# Patient Record
Sex: MALE | Race: WHITE | NOT HISPANIC OR LATINO | Employment: OTHER | URBAN - METROPOLITAN AREA
[De-identification: names, ages, dates, MRNs, and addresses within clinical notes are randomized per-mention and may not be internally consistent; named-entity substitution may affect disease eponyms.]

---

## 2017-01-19 RX ORDER — DOXYCYCLINE HYCLATE 100 MG/1
100 TABLET, DELAYED RELEASE ORAL 2 TIMES DAILY
Qty: 20 TABLET | Refills: 0 | Status: SHIPPED | OUTPATIENT
Start: 2017-01-19 | End: 2017-02-14

## 2017-01-23 ENCOUNTER — OFFICE VISIT (OUTPATIENT)
Dept: FAMILY MEDICINE CLINIC | Facility: CLINIC | Age: 62
End: 2017-01-23

## 2017-01-23 VITALS
HEART RATE: 81 BPM | TEMPERATURE: 98.1 F | SYSTOLIC BLOOD PRESSURE: 102 MMHG | WEIGHT: 231.6 LBS | DIASTOLIC BLOOD PRESSURE: 68 MMHG | BODY MASS INDEX: 29.72 KG/M2 | HEIGHT: 74 IN | RESPIRATION RATE: 16 BRPM | OXYGEN SATURATION: 94 %

## 2017-01-23 DIAGNOSIS — J44.9 CHRONIC OBSTRUCTIVE PULMONARY DISEASE, UNSPECIFIED COPD TYPE (HCC): ICD-10-CM

## 2017-01-23 DIAGNOSIS — L02.429 FURUNCLE OF AXILLA: ICD-10-CM

## 2017-01-23 DIAGNOSIS — R93.7 ABNORMAL CT SCAN, LUMBAR SPINE: Primary | ICD-10-CM

## 2017-01-23 DIAGNOSIS — Z87.820 HISTORY OF MULTIPLE CONCUSSIONS: ICD-10-CM

## 2017-01-23 DIAGNOSIS — G89.29 OTHER CHRONIC PAIN: ICD-10-CM

## 2017-01-23 DIAGNOSIS — K40.21 BILATERAL RECURRENT INGUINAL HERNIA WITHOUT OBSTRUCTION OR GANGRENE: ICD-10-CM

## 2017-01-23 DIAGNOSIS — M15.9 GENERALIZED OSTEOARTHRITIS: ICD-10-CM

## 2017-01-23 PROBLEM — K40.90 INGUINAL HERNIA, RIGHT: Status: ACTIVE | Noted: 2017-01-23

## 2017-01-23 PROBLEM — B00.9 HERPES: Status: ACTIVE | Noted: 2017-01-23

## 2017-01-23 PROBLEM — S01.00XA OPEN SCALP WOUND: Status: ACTIVE | Noted: 2017-01-23

## 2017-01-23 PROBLEM — R53.83 FATIGUE: Status: ACTIVE | Noted: 2017-01-23

## 2017-01-23 PROBLEM — F52.21 ED (ERECTILE DYSFUNCTION) OF NON-ORGANIC ORIGIN: Status: ACTIVE | Noted: 2017-01-23

## 2017-01-23 PROBLEM — F34.1 DEPRESSION, NEUROTIC: Status: ACTIVE | Noted: 2017-01-23

## 2017-01-23 PROBLEM — F60.9: Status: ACTIVE | Noted: 2017-01-23

## 2017-01-23 PROBLEM — F41.9 ANXIETY: Status: ACTIVE | Noted: 2017-01-23

## 2017-01-23 PROBLEM — J45.909 AB (ASTHMATIC BRONCHITIS): Status: ACTIVE | Noted: 2017-01-23

## 2017-01-23 PROBLEM — M94.0 COSTAL CHONDRITIS: Status: ACTIVE | Noted: 2017-01-23

## 2017-01-23 PROBLEM — H61.20 CERUMEN IMPACTION: Status: ACTIVE | Noted: 2017-01-23

## 2017-01-23 PROBLEM — R60.9 DEPENDENT EDEMA: Status: ACTIVE | Noted: 2017-01-23

## 2017-01-23 PROBLEM — R35.1 EXCESSIVE URINATION AT NIGHT: Status: ACTIVE | Noted: 2017-01-23

## 2017-01-23 PROBLEM — R06.89 BREATHING DIFFICULT: Status: ACTIVE | Noted: 2017-01-23

## 2017-01-23 PROBLEM — J45.901 ACUTE ASTHMA EXACERBATION: Status: ACTIVE | Noted: 2017-01-23

## 2017-01-23 PROBLEM — K63.5 BENIGN COLONIC POLYP: Status: ACTIVE | Noted: 2017-01-23

## 2017-01-23 PROBLEM — D49.0 COLON NEOPLASM: Status: ACTIVE | Noted: 2017-01-23

## 2017-01-23 PROBLEM — R41.3 AMNESIA: Status: ACTIVE | Noted: 2017-01-23

## 2017-01-23 PROBLEM — S06.0XAA CONCUSSION: Status: ACTIVE | Noted: 2017-01-23

## 2017-01-23 PROBLEM — E04.1 SOLITARY THYROID NODULE: Status: ACTIVE | Noted: 2017-01-23

## 2017-01-23 PROBLEM — H92.09 EAR ACHE: Status: ACTIVE | Noted: 2017-01-23

## 2017-01-23 PROCEDURE — 99214 OFFICE O/P EST MOD 30 MIN: CPT | Performed by: FAMILY MEDICINE
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
## 2017-01-23 NOTE — MR AVS SNAPSHOT
Pérez CHAIREZ Alysia   1/23/2017 2:20 PM   Office Visit    Dept Phone:  [phone]   Encounter #:  [id number]    Provider:  Tuan Champion MD   Department:  [facility]                Your Full Care Plan              Your Updated Medication List          This list is accurate as of: 1/23/17  3:10 PM.  Always use your most recent med list.                * albuterol (5 MG/ML) 0.5% nebulizer solution   Commonly known as:  PROVENTIL       * albuterol 108 (90 BASE) MCG/ACT inhaler   Commonly known as:  PROVENTIL HFA;VENTOLIN HFA       * VENTOLIN  (90 BASE) MCG/ACT inhaler   Generic drug:  albuterol       aspirin 81 MG chewable tablet       azelastine 0.15 % solution nasal spray   Commonly known as:  ASTEPRO       benzonatate 200 MG capsule   Commonly known as:  TESSALON       celecoxib 200 MG capsule   Commonly known as:  CeleBREX       diazePAM 5 MG tablet   Commonly known as:  VALIUM       doxycycline 100 MG enteric coated tablet   Commonly known as:  DORYX   Take 1 tablet by mouth 2 (Two) Times a Day.       DULERA 200-5 MCG/ACT inhaler   Generic drug:  mometasone-formoterol       famciclovir 500 MG tablet   Commonly known as:  FAMVIR   3 tabs stat prn onset of fever blister       HYDROcodone-acetaminophen  MG per tablet   Commonly known as:  NORCO       ipratropium-albuterol 0.5-2.5 mg/mL nebulizer   Commonly known as:  DUO-NEB       Milk Thistle 250 MG capsule       montelukast 10 MG tablet   Commonly known as:  SINGULAIR       sildenafil 100 MG tablet   Commonly known as:  VIAGRA       * Notice:  This list has 3 medication(s) that are the same as other medications prescribed for you. Read the directions carefully, and ask your doctor or other care provider to review them with you.            We Performed the Following     Ambulatory Referral to General Surgery       You Were Diagnosed With        Codes Comments    Abnormal CT scan, lumbar  "spine    -  Primary ICD-10-CM: R93.7  ICD-9-CM: 793.7     Furuncle of axilla     ICD-10-CM: L02.429  ICD-9-CM: 680.3     Bilateral recurrent inguinal hernia without obstruction or gangrene     ICD-10-CM: K40.21  ICD-9-CM: 550.93     Generalized osteoarthritis     ICD-10-CM: M15.9  ICD-9-CM: 715.00       Instructions     None    Patient Instructions History      Upcoming Appointments     Visit Type Date Time Department    OFFICE VISIT 1/23/2017  2:20 PM [facility]    OFFICE VISIT 4/24/2017  2:40 PM [facility]      MyChart Signup     Our records indicate that you have declined [facility] signup. If you would like to sign up for [employer], please email [facility] [email or web address] or call [phone] to obtain an activation code.             Other Info from Your Visit           Your Appointments     Apr 24, 2017  2:40 PM EDT   Office Visit with Tuan Champion MD   [facility] (--)    [address] [facility] [address] [phone]           Arrive 15 minutes prior to appointment.              Allergies     No Known Allergies      Reason for Visit     Abscess R underarm.    Insect Bite Spider bite in butt area over 3 weeks.      Vital Signs     Blood Pressure Pulse Temperature Respirations Height Weight    102/68 81 98.1 °F (36.7 °C) 16 74\" (188 cm) 231 lb 9.6 oz (105 kg)    Oxygen Saturation Body Mass Index Smoking Status             94% 29.74 kg/m2 Former Smoker         Problems and Diagnoses Noted     Bronchitis    Acute asthma exacerbation    Memory loss    Anxiety problem    Arthritis of wrist    Benign colonic polyp    Breathing difficult    Chronic airway obstruction    Excess wax in ear    Chronic airway obstruction    Chronic pain    Colon neoplasm    Concussion    Inflammation of rib cartilage    Accumulation of fluid in tissues    Mood problem    Personality disorder    Ear ache    Sexual dysfunction    Excessive urination at night    " Tiredness    Generalized osteoarthritis    Herpes simplex    Inguinal hernia, right    Open wound of scalp    Degenerative joint disease of shoulder region    Other shoulder lesions, unspecified shoulder    Solitary thyroid nodule    Abnormal CT scan, lumbar spine    -  Primary    Furuncle of axilla        Hernia

## 2017-02-08 ENCOUNTER — OFFICE VISIT (OUTPATIENT)
Dept: SURGERY | Facility: CLINIC | Age: 62
End: 2017-02-08

## 2017-02-08 VITALS
SYSTOLIC BLOOD PRESSURE: 120 MMHG | OXYGEN SATURATION: 95 % | BODY MASS INDEX: 29.02 KG/M2 | HEART RATE: 80 BPM | WEIGHT: 226 LBS | DIASTOLIC BLOOD PRESSURE: 90 MMHG | RESPIRATION RATE: 20 BRPM

## 2017-02-08 DIAGNOSIS — K40.21 BILATERAL RECURRENT INGUINAL HERNIA WITHOUT OBSTRUCTION OR GANGRENE: Primary | ICD-10-CM

## 2017-02-08 PROCEDURE — 99212 OFFICE O/P EST SF 10 MIN: CPT | Performed by: SPECIALIST

## 2017-02-08 NOTE — PROGRESS NOTES
Subjective   Pérez Hatfield is a 61 y.o. male.     History of Present Illness   The patient presents today for reevaluation.  He is complaining of intermittent pelvic and perineal discomfort.  Because of these symptoms a CT scan of the pelvis was done.  The CT scan confirmed the presence of bilateral recurrent inguinal hernias.  In addition there is significant disease of his lumbar and sacral spine.  In the past, he has been seen by a neurosurgeon for these issues.  The patient also has had a colonoscopy done within the last 3 or 4 years.  No other findings were seen on the CT scan.  The patient's symptoms are very difficult to pin down, and I am by no means convinced that these hernias are responsible for any of these symptoms.    Review of Systems   Constitutional: Positive for activity change. Negative for appetite change, chills, fatigue and fever.   HENT: Negative.    Cardiovascular: Negative.    Gastrointestinal: Negative for abdominal distention, abdominal pain, blood in stool, constipation, diarrhea, nausea and vomiting.   Endocrine: Negative.    Genitourinary: Negative for difficulty urinating, dysuria and hematuria.   Musculoskeletal: Positive for arthralgias and back pain.        The patient does have bilateral lower back pain   Skin: Negative.    Allergic/Immunologic: Negative.    Neurological: Negative.    Hematological: Negative.    Psychiatric/Behavioral: Negative.        Objective   Physical Exam   Constitutional: He is oriented to person, place, and time. He appears well-developed and well-nourished.   HENT:   Head: Normocephalic.   Eyes: No scleral icterus.   Neck: Normal range of motion. Neck supple. No JVD present.   Cardiovascular: Normal rate and regular rhythm.    Pulmonary/Chest: Effort normal. He has no wheezes. He has no rales.   Abdominal: Soft. Bowel sounds are normal. He exhibits no distension. There is no tenderness. A hernia is present.   The patient has bilateral inguinal hernias.   Both appear to be direct recurrences.  The hernia on the right side appears more significant.  The patient has had an open repair on the right side, and appears to have had a laparoscopic repair on the left side.   Genitourinary: Penis normal.   Genitourinary Comments: The scrotal exam is normal.  The patient has bilaterally descended testicles with no testicular masses   Musculoskeletal: Normal range of motion. He exhibits no edema or deformity.   Neurological: He is alert and oriented to person, place, and time.   Skin: Skin is warm and dry. No erythema.   Psychiatric: He has a normal mood and affect. His behavior is normal.   Vitals reviewed.      Assessment/Plan     The patient presents today with the above issues.  Clearly he does have bilateral inguinal hernias more pronounced on the right side.  The symptoms that he is having are very difficult to pin down, and I am hesitant to believe that they are on the basis of these hernias.  Clearly the hernias could be repaired, and I told the patient that if he desires, I would be willing to repair them.  The pathology clearly should be to repair the one on the right side.  There is a palpable defect on that side which probably represents a little bit more concern than what is on the left side.

## 2017-02-14 ENCOUNTER — OFFICE VISIT (OUTPATIENT)
Dept: FAMILY MEDICINE CLINIC | Facility: CLINIC | Age: 62
End: 2017-02-14

## 2017-02-14 VITALS
SYSTOLIC BLOOD PRESSURE: 98 MMHG | OXYGEN SATURATION: 96 % | WEIGHT: 225.6 LBS | HEART RATE: 97 BPM | HEIGHT: 74 IN | DIASTOLIC BLOOD PRESSURE: 68 MMHG | RESPIRATION RATE: 17 BRPM | BODY MASS INDEX: 28.95 KG/M2 | TEMPERATURE: 98.2 F

## 2017-02-14 DIAGNOSIS — J45.901 ASTHMA WITH ACUTE EXACERBATION, UNSPECIFIED ASTHMA SEVERITY: Primary | ICD-10-CM

## 2017-02-14 DIAGNOSIS — J44.9 CHRONIC OBSTRUCTIVE PULMONARY DISEASE, UNSPECIFIED COPD TYPE (HCC): ICD-10-CM

## 2017-02-14 PROCEDURE — 90471 IMMUNIZATION ADMIN: CPT | Performed by: FAMILY MEDICINE

## 2017-02-14 PROCEDURE — 90656 IIV3 VACC NO PRSV 0.5 ML IM: CPT | Performed by: FAMILY MEDICINE

## 2017-02-14 PROCEDURE — 99213 OFFICE O/P EST LOW 20 MIN: CPT | Performed by: FAMILY MEDICINE

## 2017-02-14 RX ORDER — AZITHROMYCIN 250 MG/1
TABLET, FILM COATED ORAL
Qty: 6 TABLET | Refills: 0 | Status: SHIPPED | OUTPATIENT
Start: 2017-02-14 | End: 2017-05-11

## 2017-02-14 RX ORDER — BENZONATATE 200 MG/1
200 CAPSULE ORAL 3 TIMES DAILY PRN
Qty: 40 CAPSULE | Refills: 2 | Status: SHIPPED | OUTPATIENT
Start: 2017-02-14 | End: 2017-05-11

## 2017-02-14 NOTE — PROGRESS NOTES
HPI  Pérez Hatfield is a 61 y.o. male who is here for follow up of upper respiratory infection for the last several days.  Patient has known COPD and asthmatic bronchitis and in fact was scheduled for follow-up visit with his pulmonary specialist.  Continues follow-up for shoulder pain.  So has noted a spot on the bottom of his foot.       Review of Systems   HENT: Positive for congestion, rhinorrhea, sneezing and sore throat.    Eyes: Positive for discharge.   Respiratory: Positive for chest tightness and wheezing.    Gastrointestinal: Positive for constipation.   Endocrine: Positive for cold intolerance and polyuria.   Musculoskeletal: Positive for arthralgias and back pain.   Neurological: Positive for headaches.   All other systems reviewed and are negative.        Past Medical History   Diagnosis Date   • COPD (chronic obstructive pulmonary disease)    • Goiter    • Meniscus tear    • Multiple benign polyps of large intestine    • Wrist fracture        Past Surgical History   Procedure Laterality Date   • Colonoscopy     • Polypectomy     • Hernia repair     • Knee arthroplasty     • Neck surgery     • Tonsillectomy     • Wrist surgery         Family History   Problem Relation Age of Onset   • Alzheimer's disease Other    • Thyroid disease Other    • Hypertension Other        Social History     Social History   • Marital status: Single     Spouse name: N/A   • Number of children: N/A   • Years of education: N/A     Occupational History   • retired      Social History Main Topics   • Smoking status: Former Smoker   • Smokeless tobacco: Not on file   • Alcohol use Yes      Comment: social   • Drug use: Not on file   • Sexual activity: Not on file     Other Topics Concern   • Not on file     Social History Narrative         Physical Exam   Constitutional: He appears well-developed and well-nourished. No distress.   HENT:   Head: Normocephalic and atraumatic.   Nose: Nose normal.   Mouth/Throat: Oropharynx is clear  and moist.   Eyes: Conjunctivae are normal. Pupils are equal, round, and reactive to light.   Neck: Neck supple.   Cardiovascular: Normal rate.    Pulmonary/Chest: Effort normal. No respiratory distress. He has wheezes. He has no rales.   Abdominal: Soft.   Lymphadenopathy:     He has no cervical adenopathy.   Psychiatric: He has a normal mood and affect. His behavior is normal. Judgment and thought content normal.   Nursing note and vitals reviewed.        Assessment/Plan    Pérez was seen today for cough, nasal congestion, sore throat and headache.    Diagnoses and all orders for this visit:    Asthma with acute exacerbation, unspecified asthma severity    Chronic obstructive pulmonary disease, unspecified COPD type    Other orders  -     benzonatate (TESSALON) 200 MG capsule; Take 1 capsule by mouth 3 (Three) Times a Day As Needed for cough.  -     azithromycin (ZITHROMAX) 250 MG tablet; Take 2 tablets the first day, then 1 tablet daily for 4 days.  -     Cancel: Flu Vaccine Greater Than or equal to 4yo w/Preservative  -     Flu Vaccine Greater Than or Equal To 4yo PF    Patient presents for acute exacerbation of his medic bronchitis.  Some wheezes are noted but patient seems to be in no significant respiratory distress.  He has several inhalers and in fact has a follow-up visit with his pulmonary specialist.  Do not feel steroids are needed at this time.  Will give empiric anabiotic therapy as well as symptomatic treatment for cough.  Otherwise call if symptoms worsen.    This note includes information entered using a voice recognition dictation system.  Though reviewed, some nonsensible errors may remain.

## 2017-02-22 RX ORDER — DOXYCYCLINE HYCLATE 100 MG/1
TABLET, DELAYED RELEASE ORAL
Qty: 20 TABLET | Refills: 0 | Status: SHIPPED | OUTPATIENT
Start: 2017-02-22 | End: 2017-05-11

## 2017-02-23 ENCOUNTER — TELEPHONE (OUTPATIENT)
Dept: FAMILY MEDICINE CLINIC | Facility: CLINIC | Age: 62
End: 2017-02-23

## 2017-02-23 NOTE — TELEPHONE ENCOUNTER
----- Message from Keke Person sent at 2/23/2017 12:23 PM EST -----  PATIENT HAS A QUESTION ABOUT ANTIBIOTIC THAT WAS CALLED IN.   PLEASE CALL HIM [phone]

## 2017-05-11 ENCOUNTER — OFFICE VISIT (OUTPATIENT)
Dept: FAMILY MEDICINE CLINIC | Facility: CLINIC | Age: 62
End: 2017-05-11

## 2017-05-11 VITALS
SYSTOLIC BLOOD PRESSURE: 118 MMHG | RESPIRATION RATE: 16 BRPM | BODY MASS INDEX: 30.06 KG/M2 | OXYGEN SATURATION: 97 % | DIASTOLIC BLOOD PRESSURE: 74 MMHG | HEART RATE: 76 BPM | TEMPERATURE: 97.6 F | WEIGHT: 234.2 LBS | HEIGHT: 74 IN

## 2017-05-11 DIAGNOSIS — R53.83 FATIGUE, UNSPECIFIED TYPE: ICD-10-CM

## 2017-05-11 DIAGNOSIS — L08.9 SKIN INFECTION, BACTERIAL: ICD-10-CM

## 2017-05-11 DIAGNOSIS — Z87.820 HISTORY OF MULTIPLE CONCUSSIONS: ICD-10-CM

## 2017-05-11 DIAGNOSIS — B96.89 SKIN INFECTION, BACTERIAL: ICD-10-CM

## 2017-05-11 DIAGNOSIS — Z20.818 MRSA EXPOSURE: ICD-10-CM

## 2017-05-11 DIAGNOSIS — Z79.899 HIGH RISK MEDICATION USE: ICD-10-CM

## 2017-05-11 DIAGNOSIS — F41.9 ANXIETY: ICD-10-CM

## 2017-05-11 DIAGNOSIS — E04.1 SOLITARY THYROID NODULE: Primary | ICD-10-CM

## 2017-05-11 DIAGNOSIS — M48.02 CERVICAL SPINAL STENOSIS: ICD-10-CM

## 2017-05-11 DIAGNOSIS — K63.5 BENIGN COLONIC POLYP: ICD-10-CM

## 2017-05-11 DIAGNOSIS — Z00.00 HEALTH CARE MAINTENANCE: ICD-10-CM

## 2017-05-11 PROCEDURE — 99214 OFFICE O/P EST MOD 30 MIN: CPT | Performed by: FAMILY MEDICINE

## 2017-05-11 RX ORDER — DIAZEPAM 5 MG/1
5 TABLET ORAL EVERY 8 HOURS PRN
Qty: 40 TABLET | Refills: 2 | Status: SHIPPED | OUTPATIENT
Start: 2017-05-11 | End: 2020-01-27

## 2017-05-12 LAB
ALBUMIN SERPL-MCNC: 4.3 G/DL (ref 3.5–5.2)
ALBUMIN/GLOB SERPL: 1.5 G/DL
ALP SERPL-CCNC: 84 U/L (ref 39–117)
ALT SERPL-CCNC: 21 U/L (ref 1–41)
AST SERPL-CCNC: 20 U/L (ref 1–40)
BASOPHILS # BLD AUTO: 0.03 10*3/MM3 (ref 0–0.2)
BASOPHILS NFR BLD AUTO: 0.4 % (ref 0–1.5)
BILIRUB SERPL-MCNC: 0.5 MG/DL (ref 0.1–1.2)
BUN SERPL-MCNC: 22 MG/DL (ref 8–23)
BUN/CREAT SERPL: 19.5 (ref 7–25)
CALCIUM SERPL-MCNC: 9.5 MG/DL (ref 8.6–10.5)
CHLORIDE SERPL-SCNC: 99 MMOL/L (ref 98–107)
CO2 SERPL-SCNC: 28.7 MMOL/L (ref 22–29)
CREAT SERPL-MCNC: 1.13 MG/DL (ref 0.76–1.27)
EOSINOPHIL # BLD AUTO: 0.39 10*3/MM3 (ref 0–0.7)
EOSINOPHIL NFR BLD AUTO: 5.1 % (ref 0.3–6.2)
ERYTHROCYTE [DISTWIDTH] IN BLOOD BY AUTOMATED COUNT: 13.3 % (ref 11.5–14.5)
GLOBULIN SER CALC-MCNC: 2.8 GM/DL
GLUCOSE SERPL-MCNC: 88 MG/DL (ref 65–99)
HCT VFR BLD AUTO: 47.7 % (ref 40.4–52.2)
HCV AB S/CO SERPL IA: 0.1 S/CO RATIO (ref 0–0.9)
HGB BLD-MCNC: 15.2 G/DL (ref 13.7–17.6)
IMM GRANULOCYTES # BLD: 0.02 10*3/MM3 (ref 0–0.03)
IMM GRANULOCYTES NFR BLD: 0.3 % (ref 0–0.5)
LYMPHOCYTES # BLD AUTO: 2.92 10*3/MM3 (ref 0.9–4.8)
LYMPHOCYTES NFR BLD AUTO: 38.1 % (ref 19.6–45.3)
MCH RBC QN AUTO: 30.7 PG (ref 27–32.7)
MCHC RBC AUTO-ENTMCNC: 31.9 G/DL (ref 32.6–36.4)
MCV RBC AUTO: 96.4 FL (ref 79.8–96.2)
MONOCYTES # BLD AUTO: 0.9 10*3/MM3 (ref 0.2–1.2)
MONOCYTES NFR BLD AUTO: 11.7 % (ref 5–12)
NEUTROPHILS # BLD AUTO: 3.41 10*3/MM3 (ref 1.9–8.1)
NEUTROPHILS NFR BLD AUTO: 44.4 % (ref 42.7–76)
PLATELET # BLD AUTO: 176 10*3/MM3 (ref 140–500)
POTASSIUM SERPL-SCNC: 4.1 MMOL/L (ref 3.5–5.2)
PROT SERPL-MCNC: 7.1 G/DL (ref 6–8.5)
RBC # BLD AUTO: 4.95 10*6/MM3 (ref 4.6–6)
SODIUM SERPL-SCNC: 143 MMOL/L (ref 136–145)
T4 FREE SERPL-MCNC: 1.04 NG/DL (ref 0.93–1.7)
TSH SERPL DL<=0.005 MIU/L-ACNC: 2.54 MIU/ML (ref 0.27–4.2)
WBC # BLD AUTO: 7.67 10*3/MM3 (ref 4.5–10.7)

## 2017-05-14 LAB
BACTERIA SPEC AEROBE CULT: NORMAL
BACTERIA SPEC CULT: NORMAL

## 2017-05-15 ENCOUNTER — TELEPHONE (OUTPATIENT)
Dept: FAMILY MEDICINE CLINIC | Facility: CLINIC | Age: 62
End: 2017-05-15

## 2017-05-25 DIAGNOSIS — M54.5 LOW BACK PAIN, UNSPECIFIED BACK PAIN LATERALITY, UNSPECIFIED CHRONICITY, WITH SCIATICA PRESENCE UNSPECIFIED: Primary | ICD-10-CM

## 2017-05-26 DIAGNOSIS — M54.5 CHRONIC LOW BACK PAIN, UNSPECIFIED BACK PAIN LATERALITY, WITH SCIATICA PRESENCE UNSPECIFIED: Primary | ICD-10-CM

## 2017-05-26 DIAGNOSIS — G89.29 CHRONIC LOW BACK PAIN, UNSPECIFIED BACK PAIN LATERALITY, WITH SCIATICA PRESENCE UNSPECIFIED: Primary | ICD-10-CM

## 2017-06-20 ENCOUNTER — TELEPHONE (OUTPATIENT)
Dept: NEUROSURGERY | Facility: CLINIC | Age: 62
End: 2017-06-20

## 2017-06-20 ENCOUNTER — TELEPHONE (OUTPATIENT)
Dept: FAMILY MEDICINE CLINIC | Facility: CLINIC | Age: 62
End: 2017-06-20

## 2017-06-20 DIAGNOSIS — M54.16 LUMBAR RADICULOPATHY: Primary | ICD-10-CM

## 2017-06-20 NOTE — TELEPHONE ENCOUNTER
[phone]    US scheduled at [facility] & pt says it's too expensive for him to go to hospitals. Is asking if you can call him anytime after 12 to discuss.

## 2017-08-01 ENCOUNTER — OFFICE VISIT (OUTPATIENT)
Dept: NEUROSURGERY | Facility: CLINIC | Age: 62
End: 2017-08-01

## 2017-08-01 VITALS
WEIGHT: 225 LBS | HEIGHT: 74 IN | SYSTOLIC BLOOD PRESSURE: 128 MMHG | DIASTOLIC BLOOD PRESSURE: 89 MMHG | BODY MASS INDEX: 28.88 KG/M2 | HEART RATE: 74 BPM

## 2017-08-01 DIAGNOSIS — M48.061 LUMBAR SPINAL STENOSIS: Primary | ICD-10-CM

## 2017-08-01 PROCEDURE — 99214 OFFICE O/P EST MOD 30 MIN: CPT | Performed by: NEUROLOGICAL SURGERY

## 2017-08-01 RX ORDER — GABAPENTIN 300 MG/1
CAPSULE ORAL
Refills: 0 | COMMUNITY
Start: 2017-06-09 | End: 2018-07-06

## 2017-08-01 RX ORDER — HYDROCODONE BITARTRATE AND ACETAMINOPHEN 10; 325 MG/1; MG/1
TABLET ORAL
Refills: 0 | COMMUNITY
Start: 2017-07-13 | End: 2020-01-27 | Stop reason: DRUGHIGH

## 2017-08-01 RX ORDER — DEXAMETHASONE 4 MG/1
TABLET ORAL
Qty: 2 TABLET | Refills: 0 | Status: SHIPPED | OUTPATIENT
Start: 2017-08-01 | End: 2017-08-17 | Stop reason: HOSPADM
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
## 2017-08-17 ENCOUNTER — HOSPITAL ENCOUNTER (OUTPATIENT)
Dept: GENERAL RADIOLOGY | Facility: HOSPITAL | Age: 62
Discharge: HOME | End: 2017-08-17
Attending: NEUROLOGICAL SURGERY

## 2017-08-17 ENCOUNTER — HOSPITAL ENCOUNTER (OUTPATIENT)
Dept: CT IMAGING | Facility: HOSPITAL | Age: 62
Discharge: HOME | End: 2017-08-17
Attending: NEUROLOGICAL SURGERY | Admitting: NEUROLOGICAL SURGERY

## 2017-08-17 VITALS
SYSTOLIC BLOOD PRESSURE: 125 MMHG | BODY MASS INDEX: 28.88 KG/M2 | WEIGHT: 225 LBS | RESPIRATION RATE: 18 BRPM | HEIGHT: 74 IN | HEART RATE: 56 BPM | TEMPERATURE: 97.5 F | OXYGEN SATURATION: 95 % | DIASTOLIC BLOOD PRESSURE: 84 MMHG

## 2017-08-17 DIAGNOSIS — M48.061 LUMBAR SPINAL STENOSIS: ICD-10-CM

## 2017-08-17 PROCEDURE — 72052 X-RAY EXAM NECK SPINE 6/>VWS: CPT

## 2017-08-17 PROCEDURE — 72114 X-RAY EXAM L-S SPINE BENDING: CPT

## 2017-08-17 PROCEDURE — 0 IOPAMIDOL 61 % SOLUTION: Performed by: NEUROLOGICAL SURGERY

## 2017-08-17 PROCEDURE — 72125 CT NECK SPINE W/O DYE: CPT

## 2017-08-17 PROCEDURE — 72131 CT LUMBAR SPINE W/O DYE: CPT

## 2017-08-17 PROCEDURE — 72270 MYELOGPHY 2/> SPINE REGIONS: CPT

## 2017-08-17 RX ORDER — ACETAMINOPHEN 325 MG/1
650 TABLET ORAL EVERY 4 HOURS PRN
Status: DISCONTINUED | OUTPATIENT
Start: 2017-08-17 | End: 2017-08-18 | Stop reason: HOSPADM

## 2017-08-17 RX ORDER — HYDROCODONE BITARTRATE AND ACETAMINOPHEN 5; 325 MG/1; MG/1
1 TABLET ORAL EVERY 4 HOURS PRN
Status: DISCONTINUED | OUTPATIENT
Start: 2017-08-17 | End: 2017-08-18 | Stop reason: HOSPADM

## 2017-08-17 RX ORDER — LIDOCAINE HYDROCHLORIDE 10 MG/ML
10 INJECTION, SOLUTION INFILTRATION; PERINEURAL ONCE
Status: COMPLETED | OUTPATIENT
Start: 2017-08-17 | End: 2017-08-17

## 2017-08-17 RX ADMIN — LIDOCAINE HYDROCHLORIDE 4 ML: 10 INJECTION, SOLUTION INFILTRATION; PERINEURAL at 07:56

## 2017-08-17 RX ADMIN — IOPAMIDOL 15 ML: 612 INJECTION, SOLUTION INTRATHECAL at 07:58

## 2017-08-17 NOTE — NURSING NOTE
Reviewed discharge instructions with patient. Patient able to teach back. Wheeled patient out to lobby via wheelchair where sister awaited with car.

## 2017-08-17 NOTE — DISCHARGE INSTRUCTIONS
EDUCATION /DISCHARGE INSTRUCTIONS:  A myelogram is a special radiology procedure of the spinal cord, spinal nerves and other related structures.  You will be awake during the examination.  An area of your lower back will be cleansed with an antiseptic solution.  The physician will inject a numbing medication in your lower back.  While your back is numb, a needle will be placed in the lower back area.  A small amount of spinal fluid may be withdrawn and sent to the lab if ordered by your physician. While the needle is in the back, an injection of a contrast material (xray dye) will be given through the needle.  The contrast material will allow the physician to see the spinal cord and spinal nerves.  Once injected, the needle will be removed and a band aid will be placed over the injection site.  The table will be tilted during the process to allow the contrast material to flow to particular areas in the spine.  Following the injection and xrays, you will be taken to the CT scan where more pictures will be taken. After the procedure is finished, the contrast material will be absorbed by your body and eliminated through your kidneys.  The radiologist will study and interpret your myelogram and send the results to your physician.    Procedure risks of a myelogram include, but are not limited to:  *  Bleeding   *  seizure  *  Infection   *  Headache, possibly severe requiring  *  Contrast reaction      a blood patch  *  Nerve or cord injury  *  Paralysis and death    Benefits of the procedure:  *  Best examination for delineating pathology related to spinal cord compression from a disc and/or nerve root compression    Alternatives to the procedure:  MRI - a non invasive procedure requiring intravenous contrast injection.  Cannot be done on patients with certain pacemakers or metal in the body.  MRI risks include possible reaction to the contrast material, movement of metal located in the body.  Benefit to MRI:   Non-invasive and usually painless procedure.  THIS EDUCATION INFORMATION WAS REVIEWED PRIOR TO PROCEDURE AND CONSENT. Patient initials________________Time_______0715___________    Important information following your myelogram:  *  Lie down with your head elevated no more than 2 pillows high today & tonight  *  Tomorrow, lie down with 1 pillow all day and all night  *  Sit up to eat meals and use the bathroom, otherwise, lie down  *  Do not drive for 48 hours following a myelogram  *  You may remove the bandage and shower in the morning  *  Increase your fluids for the next 24 hours.  Caffeinated drinks are encouraged.      Resume taking diabetic medication on _____________N/A_______________________    Resume taking blood thinner or aspirin on ___________Resume on 8/18/17______________________    Headaches are a common side effect after a myelogram.  If you get a headache, you should stay flat in bed and drink plenty of fluids. If the headache persist and does not go away with rest/medication, CALL Dr. Fraser at [phone]

## 2017-08-29 ENCOUNTER — OFFICE VISIT (OUTPATIENT)
Dept: NEUROSURGERY | Facility: CLINIC | Age: 62
End: 2017-08-29

## 2017-08-29 VITALS
HEART RATE: 71 BPM | BODY MASS INDEX: 28.88 KG/M2 | DIASTOLIC BLOOD PRESSURE: 82 MMHG | WEIGHT: 225 LBS | HEIGHT: 74 IN | SYSTOLIC BLOOD PRESSURE: 125 MMHG

## 2017-08-29 DIAGNOSIS — M48.061 LUMBAR SPINAL STENOSIS: Primary | ICD-10-CM

## 2017-08-29 PROCEDURE — 99213 OFFICE O/P EST LOW 20 MIN: CPT | Performed by: NEUROLOGICAL SURGERY

## 2017-08-29 NOTE — PROGRESS NOTES
Subjective   Patient ID: Pérez Hatfield is a 62 y.o. male is here today for follow-up with new Lumbar Myelogram ordered for back pain that radiates into bilateral lower extremity's with numbness and tingling in bilateral lower extremity's.     History of Present Illness     This patient continues with fairly severe pain primarily in his lower back.  There is radiation into both of his legs but it is worse on the right than the left.  He also has numbness and tingling in both of his legs.    The following portions of the patient's history were reviewed and updated as appropriate: allergies, current medications, past family history, past medical history, past social history, past surgical history and problem list.    Review of Systems   Constitutional: Positive for activity change.   Respiratory: Negative for chest tightness and shortness of breath.    Cardiovascular: Negative for chest pain.   Musculoskeletal: Positive for back pain.        Bilateral leg pain   Neurological: Positive for numbness.        Tingling in lower extremity's   All other systems reviewed and are negative.      Objective   Physical Exam   Constitutional: He is oriented to person, place, and time. He appears well-developed and well-nourished.   Neurological: He is oriented to person, place, and time.     Neurologic Exam     Mental Status   Oriented to person, place, and time.       Assessment/Plan   Independent Review of Radiographic Studies:      I reviewed his plain films, myelogram, and CT scan in the cervical and lumbar spine myself.  The plain films show a solid fusion at the cervical spine from C3 down to C6.  There is no evidence of abnormal movement on flexion and extension.  The neural foramina are widely patent.  In the lumbar spine there is also no evidence of instability and just minimal scoliosis.  In the lumbar portion of the films there is fairly significant lateral recess stenosis at L4 5 bilaterally.  L5-S1 is fairly open but  there is some left lateral recess stenosis at L3 4 and right lateral recess stenosis at L2-3.  L1 2 is fairly open.  On the oblique films the lateral recess stenosis at L3 4 is a little more significant.  On the standing films the stenosis at L4 5 is a lot worse than on the prone films.  The pictures in the cervical spine are not well opacified.  On the cervical portion of the CT scan C2 3 shows a little foraminal stenosis but not severe especially considering the level below is fused.  C3 4 is widely open bilaterally as is C4 5 and C5 6.  C6 7 shows some central disc stenosis and some foraminal stenosis but not severe and C7-T1 is fairly open.  In the lumbar portion of the test the lower thoracic spine down to L1 2 looks okay.  At L2-3 there is definite right lateral recess stenosis and bilateral stenosis at L3 4 and L4 5.  L5-S1 is fairly open.    Medical Decision Making:      I told the patient about the test.  I told him that from my point of view I would not recommend any type of surgery at this point on his neck.  I did discuss a 3 level decompression and fusion with him however in his lower back.  With him having more severe lateral recess stenosis on the right side at L2-3 and L3 4 but on the left at L4 5 I think it would be difficult to do a minimally invasive decompression and still help him.  Consequently I think he will need a bilateral decompression and fusion.  I told her there was nothing here so severe that we absolutely have to do surgery but it is certainly an option if he wishes to proceed.  I told the patient about the risks, complications and expected outcome of the lumbar surgery.  I explained that there was an 80% chance of getting rid of the pain in the leg.  I explained that there would still be back pain after the surgery.  Initially this will be quite severe but will improve over time.  There is a 2 or 3% chance of infection, bleeding, CSF leak, damage to the nerve as a result of surgery,  paralysis, as well as anesthetic risk.  There is a 10% chance of recurrent problems.  There is a 10% chance of nonunion or failure of the instrumentation.  We discussed the postoperative hospital and home course.  The patient does ask to think about it and will call if he wishes to proceed.  In the meantime we will get him into see an orthopedic spine surgeon for a fusion evaluation.    If he decides to proceed he will need to be scheduled for a: Bilateral L2 to L5 laminectomy with a fusion by orthopedics    Pérez was seen today for back pain.    Diagnoses and all orders for this visit:    Lumbar spinal stenosis    Return for 2-3 week post op.

## 2017-08-29 NOTE — PATIENT INSTRUCTIONS

## 2017-09-20 ENCOUNTER — OFFICE VISIT (OUTPATIENT)
Dept: ORTHOPEDIC SURGERY | Facility: CLINIC | Age: 62
End: 2017-09-20

## 2017-09-20 VITALS — WEIGHT: 224 LBS | BODY MASS INDEX: 31.36 KG/M2 | HEIGHT: 71 IN | TEMPERATURE: 98.3 F

## 2017-09-20 DIAGNOSIS — M41.9 ACQUIRED SCOLIOSIS: ICD-10-CM

## 2017-09-20 DIAGNOSIS — M48.061 SPINAL STENOSIS OF LUMBAR REGION: Primary | ICD-10-CM

## 2017-09-20 PROCEDURE — 99204 OFFICE O/P NEW MOD 45 MIN: CPT | Performed by: ORTHOPAEDIC SURGERY
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
## 2017-09-22 RX ORDER — SODIUM CHLORIDE, SODIUM LACTATE, POTASSIUM CHLORIDE, CALCIUM CHLORIDE 600; 310; 30; 20 MG/100ML; MG/100ML; MG/100ML; MG/100ML
100 INJECTION, SOLUTION INTRAVENOUS CONTINUOUS
Status: CANCELLED | OUTPATIENT
Start: 2017-09-22

## 2017-10-06 ENCOUNTER — OFFICE VISIT (OUTPATIENT)
Dept: FAMILY MEDICINE CLINIC | Facility: CLINIC | Age: 62
End: 2017-10-06

## 2017-10-06 VITALS
SYSTOLIC BLOOD PRESSURE: 120 MMHG | BODY MASS INDEX: 30.66 KG/M2 | HEIGHT: 71 IN | TEMPERATURE: 98.1 F | DIASTOLIC BLOOD PRESSURE: 80 MMHG | HEART RATE: 72 BPM | WEIGHT: 219 LBS | OXYGEN SATURATION: 98 %

## 2017-10-06 DIAGNOSIS — J44.9 CHRONIC OBSTRUCTIVE PULMONARY DISEASE, UNSPECIFIED COPD TYPE (HCC): ICD-10-CM

## 2017-10-06 DIAGNOSIS — M15.9 GENERALIZED OSTEOARTHRITIS: ICD-10-CM

## 2017-10-06 DIAGNOSIS — E04.1 SOLITARY THYROID NODULE: Primary | ICD-10-CM

## 2017-10-06 DIAGNOSIS — M48.061 SPINAL STENOSIS OF LUMBAR REGION, UNSPECIFIED WHETHER NEUROGENIC CLAUDICATION PRESENT: ICD-10-CM

## 2017-10-06 PROCEDURE — 99214 OFFICE O/P EST MOD 30 MIN: CPT | Performed by: FAMILY MEDICINE

## 2017-10-06 NOTE — PROGRESS NOTES
HPI  Pérez Hatfield is a 62 y.o. male who is here for follow up of multiple ongoing medical issues.  Back specialist recently recommended surgical intervention but patient is concerned because of his lung disease and potential cardiac issues.  Does have a pulmonary specialist who follows his lung issues.  Patient also reports continuing problems with his hernias.  Repair had previously been recommended but that physician has since retired.  Also has known thyroid nodule in needs a follow-up ultrasound to verify stable      Review of Systems   Constitutional: Negative for chills, diaphoresis and fever.   Respiratory:        Followed by pulmonary physician for COPD   Musculoskeletal: Positive for arthralgias and back pain.        Followed also by orthopedist for shoulder and other musculoskeletal abnormalities   All other systems reviewed and are negative.        Past Medical History:   Diagnosis Date   • COPD (chronic obstructive pulmonary disease)    • Goiter    • Meniscus tear    • Multiple benign polyps of large intestine    • Wrist fracture        Past Surgical History:   Procedure Laterality Date   • COLONOSCOPY     • HERNIA REPAIR     • KNEE ARTHROPLASTY     • NECK SURGERY     • POLYPECTOMY     • TONSILLECTOMY     • WRIST SURGERY         Family History   Problem Relation Age of Onset   • Alzheimer's disease Other    • Thyroid disease Other    • Hypertension Other        Social History     Social History   • Marital status: Single     Spouse name: N/A   • Number of children: N/A   • Years of education: N/A     Occupational History   • retired      Social History Main Topics   • Smoking status: Former Smoker     Years: 10.00     Types: Cigarettes   • Smokeless tobacco: Not on file      Comment: pt not sure of when he quit   • Alcohol use Yes      Comment: social   • Drug use: Defer   • Sexual activity: Defer     Other Topics Concern   • Not on file     Social History Narrative         Physical Exam    Constitutional: He is oriented to person, place, and time. He appears well-developed and well-nourished. No distress.   HENT:   Head: Normocephalic.   Mouth/Throat: Oropharynx is clear and moist.   Eyes: Pupils are equal, round, and reactive to light.   Neck: No thyromegaly present.   Cardiovascular: Normal rate and regular rhythm.    Pulmonary/Chest: Effort normal. No respiratory distress.   Abdominal: Soft. He exhibits no distension and no mass. There is no tenderness. A hernia is present.   Musculoskeletal: He exhibits no edema or deformity.   Lymphadenopathy:     He has no cervical adenopathy.   Neurological: He is alert and oriented to person, place, and time. He displays normal reflexes.   Skin: Skin is warm and dry.   Psychiatric: He has a normal mood and affect. His behavior is normal. Judgment and thought content normal.   Vitals reviewed.        Assessment/Plan    Pérez was seen today for follow-up.    Diagnoses and all orders for this visit:    Solitary thyroid nodule  -     US Thyroid; Future    Spinal stenosis of lumbar region, unspecified whether neurogenic claudication present    Chronic obstructive pulmonary disease, unspecified COPD type    Generalized osteoarthritis      Patient is here for routine follow-up of multiple medical problems some of which are noted above.  Surgeries recommended including spinal fixation.  Also previous general surgeon had recommended hernia repairs.  Discussed with patient risks of hernia incarceration and if suddenly becomes tender and nonreducible should go to emergency room immediately.  Patient denies known history of cardiac disease.  Do recommend getting surgical clearance from pulmonary physician prior to surgical interventions.  Otherwise continue current therapy and follow-up in 6 months    This note includes information entered using a voice recognition dictation system.  Though reviewed, some nonsensible errors may remain.

## 2017-10-16 ENCOUNTER — HOSPITAL ENCOUNTER (OUTPATIENT)
Dept: ULTRASOUND IMAGING | Facility: HOSPITAL | Age: 62
Discharge: HOME | End: 2017-10-16
Attending: FAMILY MEDICINE | Admitting: FAMILY MEDICINE

## 2017-10-16 DIAGNOSIS — E04.1 SOLITARY THYROID NODULE: ICD-10-CM

## 2017-10-16 PROCEDURE — 76536 US EXAM OF HEAD AND NECK: CPT

## 2017-12-04 ENCOUNTER — OFFICE VISIT (OUTPATIENT)
Dept: FAMILY MEDICINE CLINIC | Facility: CLINIC | Age: 62
End: 2017-12-04

## 2017-12-04 VITALS
TEMPERATURE: 97.8 F | HEART RATE: 82 BPM | SYSTOLIC BLOOD PRESSURE: 124 MMHG | HEIGHT: 71 IN | RESPIRATION RATE: 16 BRPM | OXYGEN SATURATION: 96 % | BODY MASS INDEX: 29.71 KG/M2 | DIASTOLIC BLOOD PRESSURE: 86 MMHG | WEIGHT: 212.2 LBS

## 2017-12-04 DIAGNOSIS — Z79.899 HIGH RISK MEDICATION USE: ICD-10-CM

## 2017-12-04 DIAGNOSIS — J45.909 ASTHMATIC BRONCHITIS WITHOUT COMPLICATION, UNSPECIFIED ASTHMA SEVERITY, UNSPECIFIED WHETHER PERSISTENT: Primary | ICD-10-CM

## 2017-12-04 DIAGNOSIS — K63.5 BENIGN COLONIC POLYP: ICD-10-CM

## 2017-12-04 DIAGNOSIS — N40.0 PROSTATISM: ICD-10-CM

## 2017-12-04 DIAGNOSIS — K40.90 INGUINAL HERNIA, RIGHT: ICD-10-CM

## 2017-12-04 DIAGNOSIS — G89.29 OTHER CHRONIC PAIN: ICD-10-CM

## 2017-12-04 DIAGNOSIS — M48.061 SPINAL STENOSIS OF LUMBAR REGION, UNSPECIFIED WHETHER NEUROGENIC CLAUDICATION PRESENT: ICD-10-CM

## 2017-12-04 DIAGNOSIS — Z23 IMMUNIZATION DUE: ICD-10-CM

## 2017-12-04 DIAGNOSIS — Z87.820 HISTORY OF MULTIPLE CONCUSSIONS: ICD-10-CM

## 2017-12-04 DIAGNOSIS — M48.02 CERVICAL SPINAL STENOSIS: ICD-10-CM

## 2017-12-04 PROCEDURE — G0008 ADMIN INFLUENZA VIRUS VAC: HCPCS | Performed by: FAMILY MEDICINE

## 2017-12-04 PROCEDURE — 90686 IIV4 VACC NO PRSV 0.5 ML IM: CPT | Performed by: FAMILY MEDICINE

## 2017-12-04 PROCEDURE — 99213 OFFICE O/P EST LOW 20 MIN: CPT | Performed by: FAMILY MEDICINE

## 2017-12-04 RX ORDER — DOXYCYCLINE 100 MG/1
100 CAPSULE ORAL 2 TIMES DAILY
Qty: 20 CAPSULE | Refills: 0 | Status: SHIPPED | OUTPATIENT
Start: 2017-12-04 | End: 2018-01-02

## 2017-12-04 RX ORDER — BENZONATATE 200 MG/1
200 CAPSULE ORAL 3 TIMES DAILY PRN
Qty: 30 CAPSULE | Refills: 1 | Status: SHIPPED | OUTPATIENT
Start: 2017-12-04 | End: 2018-01-05
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
## 2017-12-05 LAB
ALBUMIN SERPL-MCNC: 4.4 G/DL (ref 3.6–4.8)
ALBUMIN/GLOB SERPL: 1.6 {RATIO} (ref 1.2–2.2)
ALP SERPL-CCNC: 96 IU/L (ref 39–117)
ALT SERPL-CCNC: 27 IU/L (ref 0–44)
AST SERPL-CCNC: 35 IU/L (ref 0–40)
BASOPHILS # BLD AUTO: 0 X10E3/UL (ref 0–0.2)
BASOPHILS NFR BLD AUTO: 0 %
BILIRUB SERPL-MCNC: 1.1 MG/DL (ref 0–1.2)
BUN SERPL-MCNC: 19 MG/DL (ref 8–27)
BUN/CREAT SERPL: 18 (ref 10–24)
CALCIUM SERPL-MCNC: 9.2 MG/DL (ref 8.6–10.2)
CHLORIDE SERPL-SCNC: 101 MMOL/L (ref 96–106)
CO2 SERPL-SCNC: 24 MMOL/L (ref 18–29)
CREAT SERPL-MCNC: 1.04 MG/DL (ref 0.76–1.27)
EOSINOPHIL # BLD AUTO: 0.2 X10E3/UL (ref 0–0.4)
EOSINOPHIL NFR BLD AUTO: 2 %
ERYTHROCYTE [DISTWIDTH] IN BLOOD BY AUTOMATED COUNT: 13.6 % (ref 12.3–15.4)
GFR SERPLBLD CREATININE-BSD FMLA CKD-EPI: 77 ML/MIN/1.73
GFR SERPLBLD CREATININE-BSD FMLA CKD-EPI: 89 ML/MIN/1.73
GLOBULIN SER CALC-MCNC: 2.8 G/DL (ref 1.5–4.5)
GLUCOSE SERPL-MCNC: 91 MG/DL (ref 65–99)
HCT VFR BLD AUTO: 46.9 % (ref 37.5–51)
HGB BLD-MCNC: 16.1 G/DL (ref 13–17.7)
IMM GRANULOCYTES # BLD: 0 X10E3/UL (ref 0–0.1)
IMM GRANULOCYTES NFR BLD: 0 %
LYMPHOCYTES # BLD AUTO: 2.7 X10E3/UL (ref 0.7–3.1)
LYMPHOCYTES NFR BLD AUTO: 25 %
MCH RBC QN AUTO: 32.3 PG (ref 26.6–33)
MCHC RBC AUTO-ENTMCNC: 34.3 G/DL (ref 31.5–35.7)
MCV RBC AUTO: 94 FL (ref 79–97)
MONOCYTES # BLD AUTO: 1.1 X10E3/UL (ref 0.1–0.9)
MONOCYTES NFR BLD AUTO: 10 %
NEUTROPHILS # BLD AUTO: 6.8 X10E3/UL (ref 1.4–7)
NEUTROPHILS NFR BLD AUTO: 63 %
PLATELET # BLD AUTO: 207 X10E3/UL (ref 150–379)
POTASSIUM SERPL-SCNC: 3.7 MMOL/L (ref 3.5–5.2)
PROT SERPL-MCNC: 7.2 G/DL (ref 6–8.5)
PSA SERPL-MCNC: 0.4 NG/ML (ref 0–4)
RBC # BLD AUTO: 4.98 X10E6/UL (ref 4.14–5.8)
SODIUM SERPL-SCNC: 140 MMOL/L (ref 134–144)
WBC # BLD AUTO: 10.7 X10E3/UL (ref 3.4–10.8)

## 2017-12-14 ENCOUNTER — HOSPITAL ENCOUNTER (OUTPATIENT)
Facility: HOSPITAL | Age: 62
Setting detail: HOSPITAL OUTPATIENT SURGERY
End: 2017-12-14
Attending: SURGERY | Admitting: SURGERY

## 2017-12-14 ENCOUNTER — OFFICE VISIT (OUTPATIENT)
Dept: SURGERY | Facility: CLINIC | Age: 62
End: 2017-12-14

## 2017-12-14 VITALS
OXYGEN SATURATION: 96 % | HEART RATE: 68 BPM | BODY MASS INDEX: 30.75 KG/M2 | DIASTOLIC BLOOD PRESSURE: 82 MMHG | WEIGHT: 220.5 LBS | SYSTOLIC BLOOD PRESSURE: 121 MMHG

## 2017-12-14 DIAGNOSIS — K40.90 RIGHT INGUINAL HERNIA: Primary | ICD-10-CM

## 2017-12-14 DIAGNOSIS — K40.90 LEFT INGUINAL HERNIA: ICD-10-CM

## 2017-12-14 PROCEDURE — 99214 OFFICE O/P EST MOD 30 MIN: CPT | Performed by: SURGERY

## 2017-12-16 NOTE — PROGRESS NOTES
Subjective   Pérez Hatfield is a 62 y.o. male who presents to the office from [facility] for a symptomatic right inguinal hernia.    History of Present Illness     The patient has had bilateral inguinal hernia repairs and was diagnosed earlier this year with recurrent bilateral inguinal hernias.  He is known to have a moderately sized right inguinal hernia and a small left inguinal hernia that contained fat.  Over the past several weeks the right inguinal hernia has gotten larger and increasingly symptomatic.  He has not had any change in his bowel or bladder function.  The recurrent left inguinal hernia remains asymptomatic.    Review of Systems   Constitutional: Positive for activity change. Negative for appetite change, fatigue and fever.   HENT: Negative for trouble swallowing and voice change.    Respiratory: Negative for chest tightness and shortness of breath.    Cardiovascular: Negative for chest pain and palpitations.   Gastrointestinal: Positive for abdominal pain. Negative for blood in stool, constipation, diarrhea, nausea and vomiting.   Endocrine: Negative for cold intolerance and heat intolerance.   Genitourinary: Negative for dysuria and flank pain.   Neurological: Negative for dizziness and light-headedness.   Hematological: Negative for adenopathy. Does not bruise/bleed easily.   Psychiatric/Behavioral: Negative for agitation and confusion.     Past Medical History:   Diagnosis Date   • COPD (chronic obstructive pulmonary disease)    • Goiter    • Meniscus tear    • Multiple benign polyps of large intestine    • Wrist fracture      Past Surgical History:   Procedure Laterality Date   • COLONOSCOPY     • HERNIA REPAIR     • KNEE ARTHROPLASTY     • NECK SURGERY     • POLYPECTOMY     • TONSILLECTOMY     • WRIST SURGERY       Family History   Problem Relation Age of Onset   • Alzheimer's disease Other    • Thyroid disease Other    • Hypertension Other      Social History     Social History   •  Marital status: Single     Spouse name: N/A   • Number of children: N/A   • Years of education: N/A     Occupational History   • retired      Social History Main Topics   • Smoking status: Former Smoker     Years: 10.00     Types: Cigarettes   • Smokeless tobacco: Not on file      Comment: pt not sure of when he quit   • Alcohol use Yes      Comment: social   • Drug use: Defer   • Sexual activity: Defer     Other Topics Concern   • Not on file     Social History Narrative       Objective   Physical Exam   Constitutional: He is oriented to person, place, and time. He appears well-developed and well-nourished.  Non-toxic appearance.   Eyes: EOM are normal. No scleral icterus.   Pulmonary/Chest: Effort normal. No respiratory distress.   Abdominal: Soft. Normal appearance. There is no tenderness. A hernia is present. Hernia confirmed positive in the right inguinal area (Moderately sized reducible right inguinal hernia) and confirmed positive in the left inguinal area (Small reducible left inguinal hernia).   Neurological: He is alert and oriented to person, place, and time.   Skin: Skin is warm and dry.   Psychiatric: He has a normal mood and affect. His behavior is normal. Judgment and thought content normal.       Assessment/Plan       The primary encounter diagnosis was Right inguinal hernia. A diagnosis of Left inguinal hernia was also pertinent to this visit.    The patient has bilateral inguinal hernias.  The right inguinal hernia is getting larger and increasingly symptomatic.  The left inguinal hernia is small and asymptomatic.  This was discussed at length with him.  He lives at home by himself and a bilateral inguinal hernia repair may be too limiting for him.  He has been scheduled for a right inguinal hernia repair.  The patient understands the indications, alternatives, risks, and benefits of the procedure and wishes to proceed.

## 2018-01-02 ENCOUNTER — TELEPHONE (OUTPATIENT)
Dept: FAMILY MEDICINE CLINIC | Facility: CLINIC | Age: 63
End: 2018-01-02

## 2018-01-02 RX ORDER — AMOXICILLIN 500 MG/1
500 CAPSULE ORAL 3 TIMES DAILY
Qty: 30 CAPSULE | Refills: 0 | Status: SHIPPED | OUTPATIENT
Start: 2018-01-02 | End: 2018-01-05

## 2018-01-02 NOTE — TELEPHONE ENCOUNTER
Left voice message.     ----- Message from Tuan Champion MD sent at 1/2/2018 11:00 AM EST -----  Inform patient prescription sent for a penicillin antibiotic.    ----- Message -----     From: Keke Person     Sent: 1/2/2018  10:14 AM       To: Tuan Champion MD    PT WOULD LIKE TO KNOW IF YOU COULD SEND HIM AN ANTIBIOTIC.  SAYS HE FINISHED THE LAST ONE AND 3 DAYS LATER STARTED FEELING BAD AGAIN    [facility] [place] [address] [phone] [facility] [phone] FX

## 2018-01-05 ENCOUNTER — OFFICE VISIT (OUTPATIENT)
Dept: FAMILY MEDICINE CLINIC | Facility: CLINIC | Age: 63
End: 2018-01-05

## 2018-01-05 VITALS
BODY MASS INDEX: 29.82 KG/M2 | SYSTOLIC BLOOD PRESSURE: 100 MMHG | WEIGHT: 213 LBS | OXYGEN SATURATION: 99 % | HEART RATE: 78 BPM | HEIGHT: 71 IN | TEMPERATURE: 98.1 F | DIASTOLIC BLOOD PRESSURE: 78 MMHG

## 2018-01-05 DIAGNOSIS — K59.03 THERAPEUTIC OPIOID INDUCED CONSTIPATION: ICD-10-CM

## 2018-01-05 DIAGNOSIS — J40 BRONCHITIS: Primary | ICD-10-CM

## 2018-01-05 DIAGNOSIS — T40.2X5A THERAPEUTIC OPIOID INDUCED CONSTIPATION: ICD-10-CM

## 2018-01-05 DIAGNOSIS — J44.9 CHRONIC OBSTRUCTIVE PULMONARY DISEASE, UNSPECIFIED COPD TYPE (HCC): ICD-10-CM

## 2018-01-05 DIAGNOSIS — Z87.820 HISTORY OF MULTIPLE CONCUSSIONS: ICD-10-CM

## 2018-01-05 PROBLEM — J45.901 ACUTE ASTHMA EXACERBATION: Status: RESOLVED | Noted: 2017-01-23 | Resolved: 2018-01-05

## 2018-01-05 PROBLEM — J45.909 AB (ASTHMATIC BRONCHITIS): Status: RESOLVED | Noted: 2017-01-23 | Resolved: 2018-01-05

## 2018-01-05 PROBLEM — R06.89 BREATHING DIFFICULT: Status: RESOLVED | Noted: 2017-01-23 | Resolved: 2018-01-05

## 2018-01-05 PROCEDURE — 99213 OFFICE O/P EST LOW 20 MIN: CPT | Performed by: FAMILY MEDICINE

## 2018-01-05 RX ORDER — LUBIPROSTONE 24 UG/1
24 CAPSULE ORAL 2 TIMES DAILY WITH MEALS
Qty: 8 CAPSULE | Refills: 0 | COMMUNITY
Start: 2018-01-05 | End: 2021-02-19

## 2018-01-05 NOTE — PROGRESS NOTES
HPI  Pérez Hatfield is a 62 y.o. male who is here for follow up persistent bronchitis though he thinks it is improving with current anabiotic therapy.  Also reports difficulty with known hernia.  Reports constipation problems and has been on opiates for many years from pain management physicians etc.  Was scheduled for hernia repair in February however is having second thoughts especially because of pain concerns?      Review of Systems   Constitutional: Negative for chills, diaphoresis and fever.   HENT: Positive for congestion and sore throat.    Respiratory: Positive for cough. Negative for shortness of breath.    Cardiovascular: Negative for chest pain.   Gastrointestinal: Positive for constipation.   Musculoskeletal: Positive for arthralgias and back pain.   All other systems reviewed and are negative.        Past Medical History:   Diagnosis Date   • COPD (chronic obstructive pulmonary disease)    • Goiter    • Meniscus tear    • Multiple benign polyps of large intestine    • Wrist fracture        Past Surgical History:   Procedure Laterality Date   • COLONOSCOPY     • HERNIA REPAIR     • KNEE ARTHROPLASTY     • NECK SURGERY     • POLYPECTOMY     • TONSILLECTOMY     • WRIST SURGERY         Family History   Problem Relation Age of Onset   • Alzheimer's disease Other    • Thyroid disease Other    • Hypertension Other        Social History     Social History   • Marital status: Single     Spouse name: N/A   • Number of children: N/A   • Years of education: N/A     Occupational History   • retired      Social History Main Topics   • Smoking status: Former Smoker     Years: 10.00     Types: Cigarettes   • Smokeless tobacco: Not on file      Comment: pt not sure of when he quit   • Alcohol use Yes      Comment: social   • Drug use: Defer   • Sexual activity: Defer     Other Topics Concern   • Not on file     Social History Narrative         Physical Exam   Constitutional: He is oriented to person, place, and time. He  appears well-nourished. No distress.   HENT:   Mouth/Throat: Oropharynx is clear and moist.   Eyes: Conjunctivae are normal. Pupils are equal, round, and reactive to light.   Neck: Normal range of motion. No thyromegaly present.   Cardiovascular: Normal rate and regular rhythm.    Pulmonary/Chest: Effort normal. No respiratory distress. He has no decreased breath sounds. He has no wheezes. He has rhonchi. He has no rales.   Abdominal: Soft. He exhibits no distension.   Musculoskeletal: He exhibits no edema or deformity.   Neurological: He is alert and oriented to person, place, and time. Coordination normal.   Skin: Skin is warm and dry.   Psychiatric: He has a normal mood and affect. His speech is normal and behavior is normal. Judgment and thought content normal. He is not actively hallucinating. Cognition and memory are normal. He is attentive.   Nursing note and vitals reviewed.        Assessment/Plan    Pérez was seen today for cough, uri and fatigue.    Diagnoses and all orders for this visit:    Bronchitis    History of multiple concussions    Chronic obstructive pulmonary disease, unspecified COPD type    Therapeutic opioid induced constipation  -     lubiprostone (AMITIZA) 24 MCG capsule; Take 1 capsule by mouth 2 (Two) Times a Day With Meals.      Patient is here mostly for a persistent recurrent bronchitis currently taking amoxicillin and seems to be improving.  Lungs continue with diffuse rhonchi but I do not appreciate any wheezes nor rales.  Suspect mostly related to his chronic COPD.  Recommend completing current antibiotic therapy.  Call if symptoms worsen or persist.  Otherwise keep routine follow-up appointment.  Is trying to wean down his opiate therapy from pain management.  Complains of significant constipation and discussed this well could be related to opiate therapy and samples given of medication.  Call if effective and will try to get prescription for more medication.  Otherwise keep  routine follow-up appointment.    This note includes information entered using a voice recognition dictation system.  Though reviewed, some nonsensible errors may remain.

## 2018-01-23 ENCOUNTER — TELEPHONE (OUTPATIENT)
Dept: FAMILY MEDICINE CLINIC | Facility: CLINIC | Age: 63
End: 2018-01-23

## 2018-01-23 RX ORDER — AZITHROMYCIN 250 MG/1
TABLET, FILM COATED ORAL
Qty: 6 TABLET | Refills: 0 | Status: SHIPPED | OUTPATIENT
Start: 2018-01-23 | End: 2018-07-06

## 2018-01-23 NOTE — TELEPHONE ENCOUNTER
Faxed over script into [facility] pharmacy. Scanned script into the media tab, also received confirmation that pharmacy received fax on medication.    Thank you.    ----- Message from Tuan Champion MD sent at 1/23/2018  4:46 PM EST -----   Unable to get prescription he prescribed by Epic?  Prescription printed and can be called or faxed?    ----- Message -----     From: Keke Person     Sent: 1/23/2018  12:58 PM       To: Tuan Champion MD    PT SAYS HE'S STILL COUGHING AND VERY CONGESTED.  WOULD LIKE TO KNOW IF YOU COULD SEND ANOTHER ANTIBIOTIC TO HIS PHARM    [facility] [place] [address] [phone] [facility] [phone] FX    PT'S NUMBER [phone]

## 2018-01-24 RX ORDER — BENZONATATE 200 MG/1
200 CAPSULE ORAL 3 TIMES DAILY PRN
Qty: 30 CAPSULE | Refills: 1 | Status: SHIPPED | OUTPATIENT
Start: 2018-01-24 | End: 2018-11-09

## 2018-02-16 ENCOUNTER — APPOINTMENT (OUTPATIENT)
Dept: PREADMISSION TESTING | Facility: HOSPITAL | Age: 63
End: 2018-02-16

## 2018-04-04 ENCOUNTER — OFFICE VISIT (OUTPATIENT)
Dept: FAMILY MEDICINE CLINIC | Facility: CLINIC | Age: 63
End: 2018-04-04

## 2018-04-04 VITALS
RESPIRATION RATE: 15 BRPM | HEART RATE: 79 BPM | DIASTOLIC BLOOD PRESSURE: 68 MMHG | BODY MASS INDEX: 31.78 KG/M2 | WEIGHT: 227 LBS | OXYGEN SATURATION: 98 % | HEIGHT: 71 IN | TEMPERATURE: 97.8 F | SYSTOLIC BLOOD PRESSURE: 100 MMHG

## 2018-04-04 DIAGNOSIS — F34.1 DEPRESSION, NEUROTIC: ICD-10-CM

## 2018-04-04 DIAGNOSIS — F41.9 ANXIETY: ICD-10-CM

## 2018-04-04 DIAGNOSIS — M15.9 GENERALIZED OSTEOARTHRITIS: Primary | ICD-10-CM

## 2018-04-04 DIAGNOSIS — G89.29 OTHER CHRONIC PAIN: ICD-10-CM

## 2018-04-04 DIAGNOSIS — J30.9 ALLERGIC RHINITIS, UNSPECIFIED CHRONICITY, UNSPECIFIED SEASONALITY, UNSPECIFIED TRIGGER: ICD-10-CM

## 2018-04-04 DIAGNOSIS — Z87.820 HISTORY OF MULTIPLE CONCUSSIONS: ICD-10-CM

## 2018-04-04 PROCEDURE — 99213 OFFICE O/P EST LOW 20 MIN: CPT | Performed by: FAMILY MEDICINE

## 2018-04-04 RX ORDER — AZELASTINE HCL 205.5 UG/1
2 SPRAY NASAL DAILY
Qty: 30 ML | Refills: 3 | Status: SHIPPED | OUTPATIENT
Start: 2018-04-04 | End: 2019-10-28 | Stop reason: SDUPTHER

## 2018-04-04 NOTE — PROGRESS NOTES
DONTAE Hatfield is a 62 y.o. male who is here for follow up of multiple ongoing medical issues including COPD allergic rhinitis and general arthritic pains.  Reports significant allergy symptoms but otherwise seems to be doing well on current regimen with no significant new complaints.      Review of Systems   HENT: Positive for congestion and rhinorrhea.    Respiratory: Positive for shortness of breath.    Musculoskeletal: Positive for arthralgias, back pain and myalgias.   All other systems reviewed and are negative.        Past Medical History:   Diagnosis Date   • COPD (chronic obstructive pulmonary disease)    • Goiter    • Meniscus tear    • Multiple benign polyps of large intestine    • Wrist fracture        Past Surgical History:   Procedure Laterality Date   • COLONOSCOPY     • HERNIA REPAIR     • KNEE ARTHROPLASTY     • NECK SURGERY     • POLYPECTOMY     • TONSILLECTOMY     • WRIST SURGERY         Family History   Problem Relation Age of Onset   • Alzheimer's disease Other    • Thyroid disease Other    • Hypertension Other        Social History     Social History   • Marital status: Single     Spouse name: N/A   • Number of children: N/A   • Years of education: N/A     Occupational History   • retired      Social History Main Topics   • Smoking status: Former Smoker     Years: 10.00     Types: Cigarettes   • Smokeless tobacco: Not on file      Comment: pt not sure of when he quit   • Alcohol use Yes      Comment: social   • Drug use: Unknown   • Sexual activity: Defer     Other Topics Concern   • Not on file     Social History Narrative   • No narrative on file         Physical Exam   Constitutional: He is oriented to person, place, and time. He appears well-developed and well-nourished.   HENT:   Head: Normocephalic.   Mouth/Throat: Oropharynx is clear and moist.   Eyes: EOM are normal. Pupils are equal, round, and reactive to light.   Neck: Normal range of motion. No thyromegaly present.    Cardiovascular: Normal rate.    Pulmonary/Chest: Effort normal. No respiratory distress.   Abdominal: Soft. He exhibits no distension. There is no tenderness.   Musculoskeletal: He exhibits no edema or deformity.   Neurological: He is alert and oriented to person, place, and time. Coordination normal.   Skin: Skin is warm and dry.   Psychiatric: He has a normal mood and affect. His behavior is normal. Judgment and thought content normal.   Nursing note and vitals reviewed.        Assessment/Plan    Pérez was seen today for follow-up.    Diagnoses and all orders for this visit:    Generalized osteoarthritis    Allergic rhinitis, unspecified chronicity, unspecified seasonality, unspecified trigger    Anxiety    Other chronic pain    Depression, neurotic    History of multiple concussions    Other orders  -     azelastine (ASTEPRO) 0.15 % solution nasal spray; 2 sprays into each nostril Daily.        Patient is here for routine follow-up of multiple medical problems some of which are noted above.  Having runny nose and other allergy symptoms but otherwise no new complaints.  Continues with shoulder difficulties being followed by his orthopedist.  Otherwise overall status seems stable on current regimen which will be continued including follow-up every 3 months.    This note includes information entered using a voice recognition dictation system.  Though reviewed, some nonsensible errors may remain.

## 2018-07-06 ENCOUNTER — OFFICE VISIT (OUTPATIENT)
Dept: FAMILY MEDICINE CLINIC | Facility: CLINIC | Age: 63
End: 2018-07-06

## 2018-07-06 VITALS
RESPIRATION RATE: 16 BRPM | BODY MASS INDEX: 29.82 KG/M2 | SYSTOLIC BLOOD PRESSURE: 100 MMHG | HEART RATE: 80 BPM | OXYGEN SATURATION: 98 % | TEMPERATURE: 97.9 F | DIASTOLIC BLOOD PRESSURE: 75 MMHG | WEIGHT: 213 LBS | HEIGHT: 71 IN

## 2018-07-06 DIAGNOSIS — M54.50 CHRONIC MIDLINE LOW BACK PAIN WITHOUT SCIATICA: Primary | ICD-10-CM

## 2018-07-06 DIAGNOSIS — Z79.899 HIGH RISK MEDICATION USE: ICD-10-CM

## 2018-07-06 DIAGNOSIS — G89.29 CHRONIC MIDLINE LOW BACK PAIN WITHOUT SCIATICA: Primary | ICD-10-CM

## 2018-07-06 DIAGNOSIS — Z87.820 HISTORY OF MULTIPLE CONCUSSIONS: ICD-10-CM

## 2018-07-06 DIAGNOSIS — J44.9 CHRONIC OBSTRUCTIVE PULMONARY DISEASE, UNSPECIFIED COPD TYPE (HCC): ICD-10-CM

## 2018-07-06 DIAGNOSIS — M54.16 LUMBAR RADICULOPATHY: ICD-10-CM

## 2018-07-06 DIAGNOSIS — M15.9 GENERALIZED OSTEOARTHRITIS: ICD-10-CM

## 2018-07-06 PROCEDURE — 99213 OFFICE O/P EST LOW 20 MIN: CPT | Performed by: FAMILY MEDICINE

## 2018-07-06 RX ORDER — OMEPRAZOLE 40 MG/1
40 CAPSULE, DELAYED RELEASE ORAL
Qty: 30 CAPSULE | Refills: 5 | Status: SHIPPED | OUTPATIENT
Start: 2018-07-06 | End: 2020-01-27

## 2018-07-06 RX ORDER — MELOXICAM 7.5 MG/1
7.5 TABLET ORAL DAILY
Qty: 30 TABLET | Refills: 5 | Status: SHIPPED | OUTPATIENT
Start: 2018-07-06 | End: 2019-10-28
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
## 2018-07-28 LAB
BASOPHILS # BLD AUTO: 0 X10E3/UL (ref 0–0.2)
BASOPHILS NFR BLD AUTO: 0 %
BUN SERPL-MCNC: 12 MG/DL (ref 8–27)
BUN/CREAT SERPL: 13 (ref 10–24)
CALCIUM SERPL-MCNC: 9.1 MG/DL (ref 8.6–10.2)
CHLORIDE SERPL-SCNC: 104 MMOL/L (ref 96–106)
CO2 SERPL-SCNC: 22 MMOL/L (ref 20–29)
CREAT SERPL-MCNC: 0.91 MG/DL (ref 0.76–1.27)
EOSINOPHIL # BLD AUTO: 0.2 X10E3/UL (ref 0–0.4)
EOSINOPHIL NFR BLD AUTO: 2 %
ERYTHROCYTE [DISTWIDTH] IN BLOOD BY AUTOMATED COUNT: 13.3 % (ref 12.3–15.4)
GLUCOSE SERPL-MCNC: 84 MG/DL (ref 65–99)
HCT VFR BLD AUTO: 48 % (ref 37.5–51)
HGB BLD-MCNC: 16.1 G/DL (ref 13–17.7)
IMM GRANULOCYTES # BLD: 0 X10E3/UL (ref 0–0.1)
IMM GRANULOCYTES NFR BLD: 0 %
LYMPHOCYTES # BLD AUTO: 1.9 X10E3/UL (ref 0.7–3.1)
LYMPHOCYTES NFR BLD AUTO: 24 %
MCH RBC QN AUTO: 31.1 PG (ref 26.6–33)
MCHC RBC AUTO-ENTMCNC: 33.5 G/DL (ref 31.5–35.7)
MCV RBC AUTO: 93 FL (ref 79–97)
MONOCYTES # BLD AUTO: 0.7 X10E3/UL (ref 0.1–0.9)
MONOCYTES NFR BLD AUTO: 9 %
NEUTROPHILS # BLD AUTO: 5.3 X10E3/UL (ref 1.4–7)
NEUTROPHILS NFR BLD AUTO: 65 %
PLATELET # BLD AUTO: 186 X10E3/UL (ref 150–379)
POTASSIUM SERPL-SCNC: 4.2 MMOL/L (ref 3.5–5.2)
RBC # BLD AUTO: 5.18 X10E6/UL (ref 4.14–5.8)
SODIUM SERPL-SCNC: 140 MMOL/L (ref 134–144)
WBC # BLD AUTO: 8.2 X10E3/UL (ref 3.4–10.8)

## 2018-08-15 ENCOUNTER — HOSPITAL ENCOUNTER (OUTPATIENT)
Dept: GENERAL RADIOLOGY | Facility: HOSPITAL | Age: 63
Discharge: HOME | End: 2018-08-15
Attending: FAMILY MEDICINE | Admitting: FAMILY MEDICINE

## 2018-08-15 DIAGNOSIS — M54.50 CHRONIC MIDLINE LOW BACK PAIN WITHOUT SCIATICA: ICD-10-CM

## 2018-08-15 DIAGNOSIS — G89.29 CHRONIC MIDLINE LOW BACK PAIN WITHOUT SCIATICA: ICD-10-CM

## 2018-08-15 PROCEDURE — 72110 X-RAY EXAM L-2 SPINE 4/>VWS: CPT

## 2018-11-09 ENCOUNTER — OFFICE VISIT (OUTPATIENT)
Dept: FAMILY MEDICINE CLINIC | Facility: CLINIC | Age: 63
End: 2018-11-09

## 2018-11-09 VITALS
DIASTOLIC BLOOD PRESSURE: 68 MMHG | SYSTOLIC BLOOD PRESSURE: 124 MMHG | HEIGHT: 71 IN | BODY MASS INDEX: 29.96 KG/M2 | HEART RATE: 86 BPM | RESPIRATION RATE: 18 BRPM | TEMPERATURE: 98.3 F | OXYGEN SATURATION: 97 % | WEIGHT: 214 LBS

## 2018-11-09 DIAGNOSIS — Z86.010 HX OF COLONIC POLYPS: ICD-10-CM

## 2018-11-09 DIAGNOSIS — J40 BRONCHITIS: ICD-10-CM

## 2018-11-09 DIAGNOSIS — E04.1 SOLITARY THYROID NODULE: Primary | ICD-10-CM

## 2018-11-09 DIAGNOSIS — Z23 NEED FOR VACCINATION: ICD-10-CM

## 2018-11-09 DIAGNOSIS — F60.9: ICD-10-CM

## 2018-11-09 DIAGNOSIS — L98.9 SKIN LESIONS: ICD-10-CM

## 2018-11-09 PROCEDURE — 99214 OFFICE O/P EST MOD 30 MIN: CPT | Performed by: FAMILY MEDICINE

## 2018-11-09 PROCEDURE — G0008 ADMIN INFLUENZA VIRUS VAC: HCPCS | Performed by: FAMILY MEDICINE

## 2018-11-09 PROCEDURE — 90686 IIV4 VACC NO PRSV 0.5 ML IM: CPT | Performed by: FAMILY MEDICINE

## 2018-11-09 RX ORDER — AZITHROMYCIN 250 MG/1
TABLET, FILM COATED ORAL
Qty: 6 TABLET | Refills: 0 | Status: SHIPPED | OUTPATIENT
Start: 2018-11-09 | End: 2019-10-28

## 2018-11-09 RX ORDER — BENZONATATE 200 MG/1
200 CAPSULE ORAL 3 TIMES DAILY PRN
Qty: 30 CAPSULE | Refills: 1 | Status: SHIPPED | OUTPATIENT
Start: 2018-11-09 | End: 2019-10-28
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
## 2018-12-03 DIAGNOSIS — B00.4 HERPES SIMIAE INFECTION: ICD-10-CM

## 2018-12-03 RX ORDER — FAMCICLOVIR 500 MG/1
TABLET ORAL
Qty: 3 TABLET | Refills: 5 | Status: SHIPPED | OUTPATIENT
Start: 2018-12-03 | End: 2020-01-27

## 2018-12-21 RX ORDER — OSELTAMIVIR PHOSPHATE 75 MG/1
75 CAPSULE ORAL 2 TIMES DAILY
Qty: 10 CAPSULE | Refills: 0 | Status: SHIPPED | OUTPATIENT
Start: 2018-12-21 | End: 2019-10-28

## 2019-02-02 NOTE — TELEPHONE ENCOUNTER
"Spoke with the patient.  He notes that when he was in the hospital, he was given a \"butterfly\" pillow.  He notes that it is wearing out now and he would like a new order for a new pillow faxed to [facility].    He can be reached at [phone].  " no radiation

## 2019-08-08 DIAGNOSIS — K63.5 BENIGN COLONIC POLYP: ICD-10-CM

## 2019-08-08 DIAGNOSIS — K40.90 INGUINAL HERNIA, RIGHT: Primary | ICD-10-CM

## 2019-08-17 ENCOUNTER — PREP FOR SURGERY (OUTPATIENT)
Dept: OTHER | Facility: HOSPITAL | Age: 64
End: 2019-08-17

## 2019-08-17 DIAGNOSIS — Z86.010 HX OF ADENOMATOUS COLONIC POLYPS: Primary | ICD-10-CM

## 2019-10-28 ENCOUNTER — OFFICE VISIT (OUTPATIENT)
Dept: FAMILY MEDICINE CLINIC | Facility: CLINIC | Age: 64
End: 2019-10-28

## 2019-10-28 VITALS
SYSTOLIC BLOOD PRESSURE: 134 MMHG | BODY MASS INDEX: 30.27 KG/M2 | TEMPERATURE: 97.7 F | HEART RATE: 95 BPM | DIASTOLIC BLOOD PRESSURE: 76 MMHG | RESPIRATION RATE: 16 BRPM | HEIGHT: 71 IN | WEIGHT: 216.2 LBS | OXYGEN SATURATION: 95 %

## 2019-10-28 DIAGNOSIS — Z79.899 HIGH RISK MEDICATION USE: Primary | ICD-10-CM

## 2019-10-28 DIAGNOSIS — N40.0 PROSTATISM: ICD-10-CM

## 2019-10-28 DIAGNOSIS — G89.29 OTHER CHRONIC PAIN: ICD-10-CM

## 2019-10-28 DIAGNOSIS — Z87.820 HISTORY OF MULTIPLE CONCUSSIONS: ICD-10-CM

## 2019-10-28 DIAGNOSIS — Z23 IMMUNIZATION DUE: ICD-10-CM

## 2019-10-28 DIAGNOSIS — M75.112 INCOMPLETE TEAR OF LEFT ROTATOR CUFF, UNSPECIFIED WHETHER TRAUMATIC: ICD-10-CM

## 2019-10-28 DIAGNOSIS — E04.1 SOLITARY THYROID NODULE: ICD-10-CM

## 2019-10-28 DIAGNOSIS — J44.1 CHRONIC OBSTRUCTIVE PULMONARY DISEASE WITH ACUTE EXACERBATION (HCC): ICD-10-CM

## 2019-10-28 DIAGNOSIS — M54.16 LUMBAR RADICULOPATHY: ICD-10-CM

## 2019-10-28 PROBLEM — R07.9 CHEST PAIN: Status: ACTIVE | Noted: 2019-02-16
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
## 2019-10-28 PROBLEM — H60.591: Status: ACTIVE | Noted: 2019-10-28

## 2019-10-28 PROCEDURE — 99214 OFFICE O/P EST MOD 30 MIN: CPT | Performed by: FAMILY MEDICINE

## 2019-10-28 RX ORDER — AZELASTINE HCL 205.5 UG/1
2 SPRAY NASAL DAILY
Qty: 30 ML | Refills: 3 | Status: SHIPPED | OUTPATIENT
Start: 2019-10-28 | End: 2021-02-19

## 2019-10-28 RX ORDER — PANTOPRAZOLE SODIUM 40 MG/1
40 TABLET, DELAYED RELEASE ORAL DAILY
COMMUNITY
Start: 2019-02-17 | End: 2020-01-27

## 2019-10-28 RX ORDER — SUCRALFATE 1 G/1
1 TABLET ORAL 4 TIMES DAILY
COMMUNITY
Start: 2019-02-16 | End: 2020-01-27

## 2019-10-28 RX ORDER — CYCLOBENZAPRINE HCL 10 MG
TABLET ORAL
Qty: 30 TABLET | Refills: 2 | Status: SHIPPED | OUTPATIENT
Start: 2019-10-28 | End: 2021-02-19

## 2019-10-28 RX ORDER — CETIRIZINE HYDROCHLORIDE 10 MG/1
10 TABLET ORAL DAILY
Qty: 30 TABLET | Refills: 5 | Status: SHIPPED | OUTPATIENT
Start: 2019-10-28 | End: 2021-02-19

## 2019-10-28 NOTE — PROGRESS NOTES
HPI  Pérez Hatfield is a 64 y.o. male who is here for follow up multiple medical problems.  Complains of inability to sleep mostly because of pain in the sacroiliac areas.  Patient has known back problems and in fact at one time was recommended for surgical intervention including placement of hardware etc.  Scheduled for hernia surgery and shoulder surgery next month.  Multiple other medical issues addressed.  Complains of chronic runny nose previously used antihistamine nasal spray but needs new prescription.  Discussed treatment options for sleep disorder and will initially try muscle relaxer did already on diazepam and pain medication.  Treatment risks discussed.  Past due for follow-up of thyroid nodule and repeat ultrasound will be ordered.      Review of Systems   HENT: Positive for rhinorrhea.    Musculoskeletal: Positive for arthralgias and back pain.   Psychiatric/Behavioral: Positive for sleep disturbance.   All other systems reviewed and are negative.        Past Medical History:   Diagnosis Date   • COPD (chronic obstructive pulmonary disease) (CMS/HCC)    • Goiter    • Meniscus tear    • Multiple benign polyps of large intestine    • Wrist fracture        Past Surgical History:   Procedure Laterality Date   • COLONOSCOPY     • HERNIA REPAIR     • KNEE ARTHROPLASTY     • NECK SURGERY     • POLYPECTOMY     • TONSILLECTOMY     • WRIST SURGERY         Family History   Problem Relation Age of Onset   • Alzheimer's disease Other    • Thyroid disease Other    • Hypertension Other        Social History     Socioeconomic History   • Marital status: Single     Spouse name: Not on file   • Number of children: Not on file   • Years of education: Not on file   • Highest education level: Not on file   Occupational History   • Occupation: retired   Tobacco Use   • Smoking status: Former Smoker     Years: 10.00     Types: Cigarettes   • Smokeless tobacco: Never Used   • Tobacco comment: pt not sure of when he quit    Substance and Sexual Activity   • Alcohol use: Yes     Comment: social   • Drug use: No   • Sexual activity: Not Currently         Physical Exam   Constitutional: He is oriented to person, place, and time. He appears well-developed and well-nourished. No distress.   HENT:   Head: Normocephalic and atraumatic.   Nose: Nose normal.   Mouth/Throat: Oropharynx is clear and moist.   Eyes: Conjunctivae and EOM are normal. Pupils are equal, round, and reactive to light.   Neck: Normal range of motion. No thyromegaly present.   Cardiovascular: Normal rate, regular rhythm and normal heart sounds.   Pulmonary/Chest: Effort normal. No respiratory distress. He has no wheezes.   Abdominal: Soft. He exhibits no distension. There is no tenderness. A hernia is present.   Musculoskeletal: Normal range of motion. He exhibits no edema or deformity.   Neurological: He is alert and oriented to person, place, and time. Coordination normal.   Skin: Skin is warm and dry.   Psychiatric: He has a normal mood and affect. His behavior is normal. Judgment and thought content normal.   Nursing note and vitals reviewed.        Assessment/Plan    Pérez was seen today for copd, back pain and shoulder pain.    Diagnoses and all orders for this visit:    High risk medication use  -     Comprehensive Metabolic Panel  -     CBC & Differential    Prostatism  -     PSA DIAGNOSTIC  -     Urinalysis With Microscopic If Indicated (No Culture) - Urine, Clean Catch    Solitary thyroid nodule  -     US Thyroid; Future    Chronic obstructive pulmonary disease with acute exacerbation (CMS/HCC)    Other chronic pain    Lumbar radiculopathy    Incomplete tear of left rotator cuff, unspecified whether traumatic    History of multiple concussions    Other orders  -     azelastine (ASTEPRO) 0.15 % solution nasal spray; 2 sprays into the nostril(s) as directed by provider Daily.  -     cetirizine (zyrTEC) 10 MG tablet; Take 1 tablet by mouth Daily.  -      cyclobenzaprine (FLEXERIL) 10 MG tablet; 1/2-1 tab po qhs      Patient here for routine follow-up of several medical problems some of which are noted above.  Remains on pain medication especially for chronic back pain.  Again asking for sleeping medications and this was discussed.  Much of which keeps him awake is his pain especially in the low back area and recommend trial of muscle relaxer hopefully to help with sleep as well as back discomfort.  Apparently has tried gabapentin in the past and may need to retry in the future.  Scheduled for probable surgeries next month.  Does have some constipation issues again discussed probable relationship with pain medication.  He denies overuse or misuse of medication and is aware of potential side effects.  We will continue to monitor closely including follow-up appointment every 3 months.    This note includes information entered using a voice recognition dictation system.  Though reviewed, some nonsensible errors may remain.

## 2019-10-29 LAB
ALBUMIN SERPL-MCNC: 4.5 G/DL (ref 3.6–4.8)
ALBUMIN/GLOB SERPL: 1.5 {RATIO} (ref 1.2–2.2)
ALP SERPL-CCNC: 86 IU/L (ref 39–117)
ALT SERPL-CCNC: 21 IU/L (ref 0–44)
APPEARANCE UR: CLEAR
AST SERPL-CCNC: 24 IU/L (ref 0–40)
BASOPHILS # BLD AUTO: 0 X10E3/UL (ref 0–0.2)
BASOPHILS NFR BLD AUTO: 0 %
BILIRUB SERPL-MCNC: 0.8 MG/DL (ref 0–1.2)
BILIRUB UR QL STRIP: NEGATIVE
BUN SERPL-MCNC: 12 MG/DL (ref 8–27)
BUN/CREAT SERPL: 13 (ref 10–24)
CALCIUM SERPL-MCNC: 9.2 MG/DL (ref 8.6–10.2)
CHLORIDE SERPL-SCNC: 101 MMOL/L (ref 96–106)
CO2 SERPL-SCNC: 20 MMOL/L (ref 20–29)
COLOR UR: YELLOW
CREAT SERPL-MCNC: 0.92 MG/DL (ref 0.76–1.27)
EOSINOPHIL # BLD AUTO: 0.2 X10E3/UL (ref 0–0.4)
EOSINOPHIL NFR BLD AUTO: 2 %
ERYTHROCYTE [DISTWIDTH] IN BLOOD BY AUTOMATED COUNT: 13.7 % (ref 12.3–15.4)
GLOBULIN SER CALC-MCNC: 3 G/DL (ref 1.5–4.5)
GLUCOSE SERPL-MCNC: 74 MG/DL (ref 65–99)
GLUCOSE UR QL: NEGATIVE
HCT VFR BLD AUTO: 48.7 % (ref 37.5–51)
HGB BLD-MCNC: 16.9 G/DL (ref 13–17.7)
HGB UR QL STRIP: NEGATIVE
IMM GRANULOCYTES # BLD AUTO: 0 X10E3/UL (ref 0–0.1)
IMM GRANULOCYTES NFR BLD AUTO: 0 %
KETONES UR QL STRIP: NEGATIVE
LEUKOCYTE ESTERASE UR QL STRIP: NEGATIVE
LYMPHOCYTES # BLD AUTO: 2.6 X10E3/UL (ref 0.7–3.1)
LYMPHOCYTES NFR BLD AUTO: 30 %
MCH RBC QN AUTO: 32 PG (ref 26.6–33)
MCHC RBC AUTO-ENTMCNC: 34.7 G/DL (ref 31.5–35.7)
MCV RBC AUTO: 92 FL (ref 79–97)
MICRO URNS: NORMAL
MONOCYTES # BLD AUTO: 0.7 X10E3/UL (ref 0.1–0.9)
MONOCYTES NFR BLD AUTO: 8 %
NEUTROPHILS # BLD AUTO: 5.3 X10E3/UL (ref 1.4–7)
NEUTROPHILS NFR BLD AUTO: 60 %
NITRITE UR QL STRIP: NEGATIVE
PH UR STRIP: 6 [PH] (ref 5–7.5)
PLATELET # BLD AUTO: 197 X10E3/UL (ref 150–450)
POTASSIUM SERPL-SCNC: 4.2 MMOL/L (ref 3.5–5.2)
PROT SERPL-MCNC: 7.5 G/DL (ref 6–8.5)
PROT UR QL STRIP: NEGATIVE
PSA SERPL-MCNC: 0.6 NG/ML (ref 0–4)
RBC # BLD AUTO: 5.28 X10E6/UL (ref 4.14–5.8)
SODIUM SERPL-SCNC: 142 MMOL/L (ref 134–144)
SP GR UR: 1.02 (ref 1–1.03)
UROBILINOGEN UR STRIP-MCNC: 0.2 MG/DL (ref 0.2–1)
WBC # BLD AUTO: 8.8 X10E3/UL (ref 3.4–10.8)

## 2019-12-10 RX ORDER — AZITHROMYCIN 250 MG/1
TABLET, FILM COATED ORAL
Qty: 6 TABLET | Refills: 0 | Status: SHIPPED | OUTPATIENT
Start: 2019-12-10 | End: 2020-01-27

## 2019-12-16 ENCOUNTER — HOSPITAL ENCOUNTER (OUTPATIENT)
Dept: ULTRASOUND IMAGING | Facility: HOSPITAL | Age: 64
Discharge: HOME | End: 2019-12-16
Admitting: FAMILY MEDICINE

## 2019-12-16 DIAGNOSIS — E04.1 SOLITARY THYROID NODULE: ICD-10-CM

## 2019-12-16 PROCEDURE — 76536 US EXAM OF HEAD AND NECK: CPT

## 2020-01-27 ENCOUNTER — OFFICE VISIT (OUTPATIENT)
Dept: FAMILY MEDICINE CLINIC | Facility: CLINIC | Age: 65
End: 2020-01-27

## 2020-01-27 VITALS
WEIGHT: 225 LBS | TEMPERATURE: 98.3 F | BODY MASS INDEX: 31.5 KG/M2 | RESPIRATION RATE: 16 BRPM | SYSTOLIC BLOOD PRESSURE: 116 MMHG | HEIGHT: 71 IN | DIASTOLIC BLOOD PRESSURE: 78 MMHG | HEART RATE: 85 BPM | OXYGEN SATURATION: 91 %

## 2020-01-27 DIAGNOSIS — L98.9 NON-HEALING SKIN LESION OF NOSE: Primary | ICD-10-CM

## 2020-01-27 DIAGNOSIS — D22.9 BENIGN-APPEARING PIGMENTED SKIN LESION: ICD-10-CM

## 2020-01-27 PROCEDURE — 99213 OFFICE O/P EST LOW 20 MIN: CPT | Performed by: FAMILY MEDICINE

## 2020-01-27 RX ORDER — HYDROCODONE BITARTRATE AND ACETAMINOPHEN 5; 325 MG/1; MG/1
1 TABLET ORAL EVERY 6 HOURS PRN
COMMUNITY
End: 2021-02-19

## 2020-01-27 NOTE — PROGRESS NOTES
HPI  Pérez Hatfield is a 64 y.o. male who is here for concerns about skin lesions especially on his face.  Specifically's reports a lesion on the left side of his nose which he says has been present for 1 year and not healing.  Also reports in the past dermatologist removed multiple lesions from his scalp.  Skin changes around the left eye and is using medication for seborrhea.  Also has a warty growth on his lower abdomen that appears benign but he says is causing discomfort and enlarging.      Review of Systems   All other systems reviewed and are negative.        Past Medical History:   Diagnosis Date   • COPD (chronic obstructive pulmonary disease) (CMS/HCC)    • Goiter    • Meniscus tear    • Multiple benign polyps of large intestine    • Wrist fracture        Past Surgical History:   Procedure Laterality Date   • COLONOSCOPY     • HERNIA REPAIR     • KNEE ARTHROPLASTY     • NECK SURGERY     • POLYPECTOMY     • TONSILLECTOMY     • WRIST SURGERY         Family History   Problem Relation Age of Onset   • Alzheimer's disease Other    • Thyroid disease Other    • Hypertension Other        Social History     Socioeconomic History   • Marital status: Single     Spouse name: Not on file   • Number of children: Not on file   • Years of education: Not on file   • Highest education level: Not on file   Occupational History   • Occupation: retired   Tobacco Use   • Smoking status: Former Smoker     Years: 10.00     Types: Cigarettes   • Smokeless tobacco: Never Used   • Tobacco comment: pt not sure of when he quit   Substance and Sexual Activity   • Alcohol use: Yes     Comment: social   • Drug use: No   • Sexual activity: Not Currently         Physical Exam   Constitutional: He is oriented to person, place, and time. He appears well-developed and well-nourished.   HENT:   Head: Normocephalic.       Eyes: Pupils are equal, round, and reactive to light. Conjunctivae and EOM are normal.   Neck: Normal range of motion.    Cardiovascular: Normal rate and regular rhythm.   Pulmonary/Chest: Effort normal. No respiratory distress.   Musculoskeletal: Normal range of motion.   Neurological: He is alert and oriented to person, place, and time.   Skin: Skin is warm and dry.        Nursing note and vitals reviewed.        Assessment/Plan    Pérez was seen today for med management.    Diagnoses and all orders for this visit:    Non-healing skin lesion of nose  -     Ambulatory Referral to Dermatology    Benign-appearing pigmented skin lesion      Patient presents mostly concerned about skin lesions as described above.  Lower abdomen skin lesion appears benign but lesion on nose is somewhat suspicious for basal cell.  Recommend follow-up appointment with previous dermatologist.    This note includes information entered using a voice recognition dictation system.  Though reviewed, some nonsensible errors may remain.

## 2020-06-17 ENCOUNTER — TRANSCRIBE ORDERS (OUTPATIENT)
Dept: ADMINISTRATIVE | Facility: HOSPITAL | Age: 65
End: 2020-06-17

## 2020-06-17 DIAGNOSIS — J44.9 OBSTRUCTIVE CHRONIC BRONCHITIS WITHOUT EXACERBATION (HCC): Primary | ICD-10-CM

## 2020-06-25 ENCOUNTER — HOSPITAL ENCOUNTER (OUTPATIENT)
Dept: CT IMAGING | Facility: HOSPITAL | Age: 65
Discharge: HOME | End: 2020-06-25
Admitting: INTERNAL MEDICINE

## 2020-06-25 DIAGNOSIS — J44.9 OBSTRUCTIVE CHRONIC BRONCHITIS WITHOUT EXACERBATION (HCC): ICD-10-CM

## 2020-06-25 PROCEDURE — 71250 CT THORAX DX C-: CPT

## 2020-11-17 ENCOUNTER — TELEPHONE (OUTPATIENT)
Dept: FAMILY MEDICINE CLINIC | Facility: CLINIC | Age: 65
End: 2020-11-17

## 2020-11-17 NOTE — TELEPHONE ENCOUNTER
PATIENT IS CALLING NEEDING TO DROP OFF [employer] PAPERWORK    PATIENT HAS COPD AND IS NEEDING DR. WU TO FILL OUT THE PAPERWORK     THE PAPERWORK IS NEEDING TO BE FILLED OUT AND MAILED IN BACK IN ASAP    PATIENT IS ALSO NEEDING TO GET HIS HANDICAP STICKER RENEWED PATIENT IS NEEDING DR. WU SIGNATURE     PATIENT HAS A APPOINTMENT ON 11-23-20        PLEASE CONTACT PATIENT @[phone]

## 2020-11-23 ENCOUNTER — OFFICE VISIT (OUTPATIENT)
Dept: FAMILY MEDICINE CLINIC | Facility: CLINIC | Age: 65
End: 2020-11-23

## 2020-11-23 VITALS
OXYGEN SATURATION: 91 % | TEMPERATURE: 97.4 F | HEIGHT: 71 IN | BODY MASS INDEX: 30.8 KG/M2 | HEART RATE: 89 BPM | WEIGHT: 220 LBS | RESPIRATION RATE: 16 BRPM

## 2020-11-23 DIAGNOSIS — Z23 NEED FOR PNEUMOCOCCAL VACCINATION: ICD-10-CM

## 2020-11-23 DIAGNOSIS — M15.9 GENERALIZED OSTEOARTHRITIS: ICD-10-CM

## 2020-11-23 DIAGNOSIS — F41.9 ANXIETY: ICD-10-CM

## 2020-11-23 DIAGNOSIS — Z23 FLU VACCINE NEED: ICD-10-CM

## 2020-11-23 DIAGNOSIS — F60.9: ICD-10-CM

## 2020-11-23 DIAGNOSIS — E04.1 SOLITARY THYROID NODULE: ICD-10-CM

## 2020-11-23 DIAGNOSIS — M54.16 LUMBAR RADICULOPATHY: ICD-10-CM

## 2020-11-23 DIAGNOSIS — E78.5 HYPERLIPIDEMIA, UNSPECIFIED HYPERLIPIDEMIA TYPE: ICD-10-CM

## 2020-11-23 DIAGNOSIS — Z79.899 HIGH RISK MEDICATION USE: ICD-10-CM

## 2020-11-23 DIAGNOSIS — J44.1 CHRONIC OBSTRUCTIVE PULMONARY DISEASE WITH ACUTE EXACERBATION (HCC): Primary | ICD-10-CM

## 2020-11-23 PROBLEM — J96.02 ACUTE HYPERCAPNIC RESPIRATORY FAILURE: Status: ACTIVE | Noted: 2019-11-26

## 2020-11-23 PROBLEM — M19.012 ARTHRITIS OF LEFT ACROMIOCLAVICULAR JOINT: Status: ACTIVE | Noted: 2019-11-26

## 2020-11-23 PROCEDURE — 90732 PPSV23 VACC 2 YRS+ SUBQ/IM: CPT | Performed by: FAMILY MEDICINE

## 2020-11-23 PROCEDURE — 99214 OFFICE O/P EST MOD 30 MIN: CPT | Performed by: FAMILY MEDICINE

## 2020-11-23 PROCEDURE — G0008 ADMIN INFLUENZA VIRUS VAC: HCPCS | Performed by: FAMILY MEDICINE

## 2020-11-23 PROCEDURE — G0009 ADMIN PNEUMOCOCCAL VACCINE: HCPCS | Performed by: FAMILY MEDICINE

## 2020-11-23 PROCEDURE — 90694 VACC AIIV4 NO PRSRV 0.5ML IM: CPT | Performed by: FAMILY MEDICINE

## 2020-11-23 RX ORDER — BETAMETHASONE DIPROPIONATE 0.5 MG/ML
LOTION, AUGMENTED TOPICAL
COMMUNITY
Start: 2020-10-20 | End: 2021-02-19

## 2020-11-23 RX ORDER — BUDESONIDE, GLYCOPYRROLATE, AND FORMOTEROL FUMARATE 160; 9; 4.8 UG/1; UG/1; UG/1
AEROSOL, METERED RESPIRATORY (INHALATION)
COMMUNITY
Start: 2020-11-20 | End: 2021-02-19 | Stop reason: SINTOL

## 2020-11-23 RX ORDER — HYDROCODONE BITARTRATE AND ACETAMINOPHEN 7.5; 325 MG/1; MG/1
1 TABLET ORAL
COMMUNITY
Start: 2020-10-27 | End: 2021-06-17

## 2020-11-23 NOTE — PROGRESS NOTES
HPI  Pérez Hatfield is a 65 y.o. male who is here for follow up of COPD and chronic back pain.  Patient apparently seen orthopedist for pain issues.  Requesting Xanax for anxiety disorder and denied.  Discussed immunization updates.  As usual unable to locate history of immunizations but in view of significant pulmonary disease recommend pneumonia and flu vaccines.  Again described extensive review of records unable to locate documentation previously vaccinated      Review of Systems   Musculoskeletal: Positive for back pain.   Psychiatric/Behavioral: The patient is nervous/anxious.    All other systems reviewed and are negative.        Past Medical History:   Diagnosis Date   • COPD (chronic obstructive pulmonary disease) (CMS/HCC)    • Goiter    • Meniscus tear    • Multiple benign polyps of large intestine    • Wrist fracture        Past Surgical History:   Procedure Laterality Date   • COLONOSCOPY     • HERNIA REPAIR     • KNEE ARTHROPLASTY     • NECK SURGERY     • POLYPECTOMY     • TONSILLECTOMY     • WRIST SURGERY         Family History   Problem Relation Age of Onset   • Alzheimer's disease Other    • Thyroid disease Other    • Hypertension Other        Social History     Socioeconomic History   • Marital status: Single     Spouse name: Not on file   • Number of children: Not on file   • Years of education: Not on file   • Highest education level: Not on file   Occupational History   • Occupation: retired   Tobacco Use   • Smoking status: Former Smoker     Packs/day: 1.00     Years: 10.00     Pack years: 10.00     Types: Cigarettes     Start date: 1976     Quit date: 1986     Years since quittin.9   • Smokeless tobacco: Never Used   • Tobacco comment: pt not sure of when he quit   Substance and Sexual Activity   • Alcohol use: Yes     Comment: social   • Drug use: No   • Sexual activity: Not Currently       Vitals:    20 1523   Pulse: 89   Resp: 16   Temp: 97.4 °F (36.3 °C)   SpO2: 91%         Body mass index is 30.7 kg/m².      Physical Exam  Constitutional:       General: He is not in acute distress.     Appearance: Normal appearance. He is ill-appearing.   HENT:      Head: Normocephalic and atraumatic.   Eyes:      Comments: Disconjugate eye movements   Cardiovascular:      Rate and Rhythm: Normal rate and regular rhythm.   Pulmonary:      Effort: Pulmonary effort is normal. No respiratory distress.   Musculoskeletal: Normal range of motion.   Skin:     General: Skin is warm and dry.   Neurological:      General: No focal deficit present.      Mental Status: He is alert and oriented to person, place, and time.   Psychiatric:         Speech: Speech normal.         Behavior: Behavior normal.         Thought Content: Thought content normal.         Cognition and Memory: Cognition normal.         Judgment: Judgment normal.           Assessment/Plan    Diagnoses and all orders for this visit:    1. Chronic obstructive pulmonary disease with acute exacerbation (CMS/HCC) (Primary)    2. Solitary thyroid nodule  -     TSH+Free T4    3. Lumbar radiculopathy    4. Anxiety    5. Generalized osteoarthritis    6. 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 High risk medication use  -     CBC & Differential  -     Comprehensive Metabolic Panel    8. Hyperlipidemia, unspecified hyperlipidemia type  -     Lipid Panel    9. Need for pneumococcal vaccination  -     pneumococcal polysaccharide 23-valent (PNEUMOVAX-23) vaccine 0.5 mL    10. Flu vaccine need  -     Fluad Quad 65+ yrs ([id number])      Patient here for follow-up of multiple ongoing medical issues some of which are noted above.  Complains of back pain will report back to orthopedist who apparently gives him his pain medication etc.  Requested Xanax denied.  Do recommend vaccine updates especially in view of significant pulmonary disease followed by pulmonologist.  Also recommend Medicare wellness evaluation at next appointment in 3 months.  Routine lab  work will be obtained as noted above.  Did have thyroid ultrasound last December which showed stable cyst.    This note includes information entered using a voice recognition dictation system.  Though reviewed, some nonsensible errors may remain.

## 2020-11-24 LAB
ALBUMIN SERPL-MCNC: 4 G/DL (ref 3.8–4.8)
ALBUMIN/GLOB SERPL: 1.4 {RATIO} (ref 1.2–2.2)
ALP SERPL-CCNC: 78 IU/L (ref 39–117)
ALT SERPL-CCNC: 20 IU/L (ref 0–44)
AST SERPL-CCNC: 24 IU/L (ref 0–40)
BASOPHILS # BLD AUTO: 0 X10E3/UL (ref 0–0.2)
BASOPHILS NFR BLD AUTO: 1 %
BILIRUB SERPL-MCNC: 0.8 MG/DL (ref 0–1.2)
BUN SERPL-MCNC: 14 MG/DL (ref 8–27)
BUN/CREAT SERPL: 17 (ref 10–24)
CALCIUM SERPL-MCNC: 8.9 MG/DL (ref 8.6–10.2)
CHLORIDE SERPL-SCNC: 99 MMOL/L (ref 96–106)
CHOLEST SERPL-MCNC: 134 MG/DL (ref 100–199)
CO2 SERPL-SCNC: 29 MMOL/L (ref 20–29)
CREAT SERPL-MCNC: 0.84 MG/DL (ref 0.76–1.27)
EOSINOPHIL # BLD AUTO: 0.2 X10E3/UL (ref 0–0.4)
EOSINOPHIL NFR BLD AUTO: 3 %
ERYTHROCYTE [DISTWIDTH] IN BLOOD BY AUTOMATED COUNT: 11.9 % (ref 11.6–15.4)
GLOBULIN SER CALC-MCNC: 2.9 G/DL (ref 1.5–4.5)
GLUCOSE SERPL-MCNC: NORMAL MG/DL
HCT VFR BLD AUTO: 49.4 % (ref 37.5–51)
HDLC SERPL-MCNC: 36 MG/DL
HGB BLD-MCNC: 16.9 G/DL (ref 13–17.7)
IMM GRANULOCYTES # BLD AUTO: 0 X10E3/UL (ref 0–0.1)
IMM GRANULOCYTES NFR BLD AUTO: 0 %
LDLC SERPL CALC-MCNC: 82 MG/DL (ref 0–99)
LYMPHOCYTES # BLD AUTO: 2.2 X10E3/UL (ref 0.7–3.1)
LYMPHOCYTES NFR BLD AUTO: 29 %
MCH RBC QN AUTO: 32.3 PG (ref 26.6–33)
MCHC RBC AUTO-ENTMCNC: 34.2 G/DL (ref 31.5–35.7)
MCV RBC AUTO: 95 FL (ref 79–97)
MONOCYTES # BLD AUTO: 0.7 X10E3/UL (ref 0.1–0.9)
MONOCYTES NFR BLD AUTO: 9 %
NEUTROPHILS # BLD AUTO: 4.4 X10E3/UL (ref 1.4–7)
NEUTROPHILS NFR BLD AUTO: 58 %
PLATELET # BLD AUTO: 165 X10E3/UL (ref 150–450)
POTASSIUM SERPL-SCNC: NORMAL MMOL/L
PROT SERPL-MCNC: 6.9 G/DL (ref 6–8.5)
RBC # BLD AUTO: 5.23 X10E6/UL (ref 4.14–5.8)
SODIUM SERPL-SCNC: 144 MMOL/L (ref 134–144)
T4 FREE SERPL-MCNC: 1.06 NG/DL (ref 0.82–1.77)
TRIGL SERPL-MCNC: 79 MG/DL (ref 0–149)
TSH SERPL DL<=0.005 MIU/L-ACNC: 1.65 UIU/ML (ref 0.45–4.5)
VLDLC SERPL CALC-MCNC: 16 MG/DL (ref 5–40)
WBC # BLD AUTO: 7.6 X10E3/UL (ref 3.4–10.8)

## 2021-02-19 ENCOUNTER — OFFICE VISIT (OUTPATIENT)
Dept: FAMILY MEDICINE CLINIC | Facility: CLINIC | Age: 66
End: 2021-02-19

## 2021-02-19 DIAGNOSIS — J44.1 CHRONIC OBSTRUCTIVE PULMONARY DISEASE WITH ACUTE EXACERBATION (HCC): Primary | ICD-10-CM

## 2021-02-19 PROBLEM — J96.02 ACUTE HYPERCAPNIC RESPIRATORY FAILURE (HCC): Status: RESOLVED | Noted: 2019-11-26 | Resolved: 2021-02-19

## 2021-02-19 PROCEDURE — 99442 PR PHYS/QHP TELEPHONE EVALUATION 11-20 MIN: CPT | Performed by: FAMILY MEDICINE

## 2021-02-19 RX ORDER — BUDESONIDE AND FORMOTEROL FUMARATE DIHYDRATE 160; 4.5 UG/1; UG/1
2 AEROSOL RESPIRATORY (INHALATION)
Qty: 10.2 G | Refills: 0 | Status: SHIPPED | OUTPATIENT
Start: 2021-02-19 | End: 2021-12-20

## 2021-02-19 RX ORDER — AZITHROMYCIN 250 MG/1
TABLET, FILM COATED ORAL
Qty: 6 TABLET | Refills: 0 | Status: SHIPPED | OUTPATIENT
Start: 2021-02-19 | End: 2021-06-17

## 2021-02-19 NOTE — PROGRESS NOTES
HPI  Pérez Hatfield is a 65 y.o. male telephone visit for persistent cough and shortness of breath.  Having to increase use of nebulizer therapy.  Has known COPD with apparent exacerbation which has lasted several days.      Review of Systems   Constitutional: Negative for chills, diaphoresis and fever.   HENT: Positive for congestion.    Respiratory: Positive for cough and shortness of breath. Negative for wheezing.         Has avoided contacts.  Wears masks and washes hands frequently etc.   Cardiovascular: Negative for chest pain.   Endocrine: Positive for cold intolerance.   All other systems reviewed and are negative.        Past Medical History:   Diagnosis Date   • Acute hypercapnic respiratory failure (CMS/HCC) 2019   • COPD (chronic obstructive pulmonary disease) (CMS/HCC)    • Goiter    • Meniscus tear    • Multiple benign polyps of large intestine    • Wrist fracture        Past Surgical History:   Procedure Laterality Date   • COLONOSCOPY     • HERNIA REPAIR     • KNEE ARTHROPLASTY     • NECK SURGERY     • POLYPECTOMY     • TONSILLECTOMY     • WRIST SURGERY         Family History   Problem Relation Age of Onset   • Alzheimer's disease Other    • Thyroid disease Other    • Hypertension Other        Social History     Socioeconomic History   • Marital status: Single     Spouse name: Not on file   • Number of children: Not on file   • Years of education: Not on file   • Highest education level: Not on file   Occupational History   • Occupation: retired   Tobacco Use   • Smoking status: Former Smoker     Packs/day: 1.00     Years: 10.00     Pack years: 10.00     Types: Cigarettes     Start date: 1976     Quit date: 1986     Years since quittin.1   • Smokeless tobacco: Never Used   • Tobacco comment: pt not sure of when he quit   Substance and Sexual Activity   • Alcohol use: Yes     Comment: social   • Drug use: No   • Sexual activity: Not Currently       There were no vitals filed for this  visit.     There is no height or weight on file to calculate BMI.      Physical Exam  Pulmonary:      Effort: Pulmonary effort is normal. No respiratory distress.   Neurological:      Mental Status: He is alert and oriented to person, place, and time.   Psychiatric:         Mood and Affect: Mood normal.         Thought Content: Thought content normal.         Judgment: Judgment normal.           Assessment/Plan    Diagnoses and all orders for this visit:    1. Chronic obstructive pulmonary disease with acute exacerbation (CMS/[facility]) (Primary)  -     budesonide-formoterol (Symbicort) 160-4.5 MCG/ACT inhaler; Inhale 2 puffs 2 (Two) Times a Day.  Dispense: 10.2 g; Refill: 0  -     azithromycin (Zithromax) 250 MG tablet; Take 2 tablets the first day, then 1 tablet daily for 4 days.  Dispense: 6 tablet; Refill: 0        Visit for acute exacerbation of COPD as discussed above.  Will treat empirically but if symptoms worsen will need to go to urgent care or emergency room for x-rays and further evaluation including Covid testing.    This visit has been rescheduled as a phone visit to comply with patient safety concerns in accordance with CDC recommendations. Total time of discussion was 12 minutes.        This note includes information entered using a voice recognition dictation system.  Though reviewed, some nonsensible errors may remain.

## 2021-03-22 ENCOUNTER — BULK ORDERING (OUTPATIENT)
Dept: CASE MANAGEMENT | Facility: OTHER | Age: 66
End: 2021-03-22

## 2021-03-22 DIAGNOSIS — Z23 IMMUNIZATION DUE: ICD-10-CM

## 2021-06-15 ENCOUNTER — APPOINTMENT (OUTPATIENT)
Dept: CT IMAGING | Facility: HOSPITAL | Age: 66
End: 2021-06-15

## 2021-06-15 ENCOUNTER — APPOINTMENT (OUTPATIENT)
Dept: GENERAL RADIOLOGY | Facility: HOSPITAL | Age: 66
End: 2021-06-15

## 2021-06-15 ENCOUNTER — HOSPITAL ENCOUNTER (EMERGENCY)
Facility: HOSPITAL | Age: 66
Discharge: HOME | End: 2021-06-15
Attending: EMERGENCY MEDICINE | Admitting: EMERGENCY MEDICINE

## 2021-06-15 VITALS
DIASTOLIC BLOOD PRESSURE: 66 MMHG | HEART RATE: 70 BPM | BODY MASS INDEX: 28.23 KG/M2 | RESPIRATION RATE: 18 BRPM | HEIGHT: 74 IN | SYSTOLIC BLOOD PRESSURE: 121 MMHG | WEIGHT: 220 LBS | OXYGEN SATURATION: 94 % | TEMPERATURE: 98.4 F

## 2021-06-15 DIAGNOSIS — R10.11 RIGHT UPPER QUADRANT ABDOMINAL PAIN: Primary | ICD-10-CM

## 2021-06-15 LAB
ALBUMIN SERPL-MCNC: 4.2 G/DL (ref 3.5–5.2)
ALBUMIN/GLOB SERPL: 1.5 G/DL
ALP SERPL-CCNC: 64 U/L (ref 39–117)
ALT SERPL W P-5'-P-CCNC: 22 U/L (ref 1–41)
ANION GAP SERPL CALCULATED.3IONS-SCNC: 9.7 MMOL/L (ref 5–15)
AST SERPL-CCNC: 17 U/L (ref 1–40)
BASOPHILS # BLD AUTO: 0.05 10*3/MM3 (ref 0–0.2)
BASOPHILS NFR BLD AUTO: 0.6 % (ref 0–1.5)
BILIRUB SERPL-MCNC: 1.2 MG/DL (ref 0–1.2)
BILIRUB UR QL STRIP: NEGATIVE
BUN SERPL-MCNC: 21 MG/DL (ref 8–23)
BUN/CREAT SERPL: 20.6 (ref 7–25)
CALCIUM SPEC-SCNC: 9.1 MG/DL (ref 8.6–10.5)
CHLORIDE SERPL-SCNC: 99 MMOL/L (ref 98–107)
CLARITY UR: CLEAR
CO2 SERPL-SCNC: 29.3 MMOL/L (ref 22–29)
COLOR UR: YELLOW
CREAT SERPL-MCNC: 1.02 MG/DL (ref 0.76–1.27)
D DIMER PPP FEU-MCNC: 1.22 MCGFEU/ML (ref 0–0.49)
DEPRECATED RDW RBC AUTO: 44.6 FL (ref 37–54)
EOSINOPHIL # BLD AUTO: 0.17 10*3/MM3 (ref 0–0.4)
EOSINOPHIL NFR BLD AUTO: 2 % (ref 0.3–6.2)
ERYTHROCYTE [DISTWIDTH] IN BLOOD BY AUTOMATED COUNT: 12.9 % (ref 12.3–15.4)
GFR SERPL CREATININE-BSD FRML MDRD: 73 ML/MIN/1.73
GLOBULIN UR ELPH-MCNC: 2.8 GM/DL
GLUCOSE SERPL-MCNC: 86 MG/DL (ref 65–99)
GLUCOSE UR STRIP-MCNC: NEGATIVE MG/DL
HCT VFR BLD AUTO: 52.8 % (ref 37.5–51)
HGB BLD-MCNC: 17.6 G/DL (ref 13–17.7)
HGB UR QL STRIP.AUTO: NEGATIVE
IMM GRANULOCYTES # BLD AUTO: 0.03 10*3/MM3 (ref 0–0.05)
IMM GRANULOCYTES NFR BLD AUTO: 0.4 % (ref 0–0.5)
KETONES UR QL STRIP: NEGATIVE
LEUKOCYTE ESTERASE UR QL STRIP.AUTO: NEGATIVE
LIPASE SERPL-CCNC: 22 U/L (ref 13–60)
LYMPHOCYTES # BLD AUTO: 2.77 10*3/MM3 (ref 0.7–3.1)
LYMPHOCYTES NFR BLD AUTO: 33.3 % (ref 19.6–45.3)
MCH RBC QN AUTO: 31.3 PG (ref 26.6–33)
MCHC RBC AUTO-ENTMCNC: 33.3 G/DL (ref 31.5–35.7)
MCV RBC AUTO: 93.8 FL (ref 79–97)
MONOCYTES # BLD AUTO: 0.83 10*3/MM3 (ref 0.1–0.9)
MONOCYTES NFR BLD AUTO: 10 % (ref 5–12)
NEUTROPHILS NFR BLD AUTO: 4.47 10*3/MM3 (ref 1.7–7)
NEUTROPHILS NFR BLD AUTO: 53.7 % (ref 42.7–76)
NITRITE UR QL STRIP: NEGATIVE
NRBC BLD AUTO-RTO: 0 /100 WBC (ref 0–0.2)
PH UR STRIP.AUTO: <=5 [PH] (ref 5–8)
PLATELET # BLD AUTO: 159 10*3/MM3 (ref 140–450)
PMV BLD AUTO: 10.3 FL (ref 6–12)
POTASSIUM SERPL-SCNC: 4 MMOL/L (ref 3.5–5.2)
PROT SERPL-MCNC: 7 G/DL (ref 6–8.5)
PROT UR QL STRIP: NEGATIVE
QT INTERVAL: 364 MS
RBC # BLD AUTO: 5.63 10*6/MM3 (ref 4.14–5.8)
SODIUM SERPL-SCNC: 138 MMOL/L (ref 136–145)
SP GR UR STRIP: >=1.03 (ref 1–1.03)
TROPONIN T SERPL-MCNC: <0.01 NG/ML (ref 0–0.03)
UROBILINOGEN UR QL STRIP: NORMAL
WBC # BLD AUTO: 8.32 10*3/MM3 (ref 3.4–10.8)

## 2021-06-15 PROCEDURE — 71275 CT ANGIOGRAPHY CHEST: CPT

## 2021-06-15 PROCEDURE — 25010000002 HYDROMORPHONE PER 4 MG: Performed by: NURSE PRACTITIONER

## 2021-06-15 PROCEDURE — 96374 THER/PROPH/DIAG INJ IV PUSH: CPT

## 2021-06-15 PROCEDURE — 74177 CT ABD & PELVIS W/CONTRAST: CPT

## 2021-06-15 PROCEDURE — 85379 FIBRIN DEGRADATION QUANT: CPT | Performed by: EMERGENCY MEDICINE

## 2021-06-15 PROCEDURE — 0 IOPAMIDOL PER 1 ML: Performed by: EMERGENCY MEDICINE

## 2021-06-15 PROCEDURE — 25010000002 HYDROMORPHONE 1 MG/ML SOLUTION: Performed by: EMERGENCY MEDICINE

## 2021-06-15 PROCEDURE — 99283 EMERGENCY DEPT VISIT LOW MDM: CPT

## 2021-06-15 PROCEDURE — 93010 ELECTROCARDIOGRAM REPORT: CPT | Performed by: INTERNAL MEDICINE

## 2021-06-15 PROCEDURE — 84484 ASSAY OF TROPONIN QUANT: CPT | Performed by: NURSE PRACTITIONER

## 2021-06-15 PROCEDURE — 81003 URINALYSIS AUTO W/O SCOPE: CPT | Performed by: NURSE PRACTITIONER

## 2021-06-15 PROCEDURE — 96375 TX/PRO/DX INJ NEW DRUG ADDON: CPT

## 2021-06-15 PROCEDURE — 85025 COMPLETE CBC W/AUTO DIFF WBC: CPT | Performed by: NURSE PRACTITIONER

## 2021-06-15 PROCEDURE — 93005 ELECTROCARDIOGRAM TRACING: CPT | Performed by: NURSE PRACTITIONER

## 2021-06-15 PROCEDURE — 71045 X-RAY EXAM CHEST 1 VIEW: CPT

## 2021-06-15 PROCEDURE — 96376 TX/PRO/DX INJ SAME DRUG ADON: CPT

## 2021-06-15 PROCEDURE — 25010000002 ONDANSETRON PER 1 MG: Performed by: NURSE PRACTITIONER

## 2021-06-15 PROCEDURE — 83690 ASSAY OF LIPASE: CPT | Performed by: NURSE PRACTITIONER

## 2021-06-15 PROCEDURE — 80053 COMPREHEN METABOLIC PANEL: CPT | Performed by: NURSE PRACTITIONER

## 2021-06-15 RX ORDER — HYDROMORPHONE HYDROCHLORIDE 1 MG/ML
0.5 INJECTION, SOLUTION INTRAMUSCULAR; INTRAVENOUS; SUBCUTANEOUS ONCE
Status: COMPLETED | OUTPATIENT
Start: 2021-06-15 | End: 2021-06-15

## 2021-06-15 RX ORDER — ONDANSETRON 2 MG/ML
4 INJECTION INTRAMUSCULAR; INTRAVENOUS ONCE
Status: COMPLETED | OUTPATIENT
Start: 2021-06-15 | End: 2021-06-15

## 2021-06-15 RX ORDER — SODIUM CHLORIDE 0.9 % (FLUSH) 0.9 %
10 SYRINGE (ML) INJECTION AS NEEDED
Status: DISCONTINUED | OUTPATIENT
Start: 2021-06-15 | End: 2021-06-15 | Stop reason: HOSPADM

## 2021-06-15 RX ADMIN — HYDROMORPHONE HYDROCHLORIDE 1 MG: 1 INJECTION, SOLUTION INTRAMUSCULAR; INTRAVENOUS; SUBCUTANEOUS at 08:20

## 2021-06-15 RX ADMIN — IOPAMIDOL 95 ML: 755 INJECTION, SOLUTION INTRAVENOUS at 07:59

## 2021-06-15 RX ADMIN — HYDROMORPHONE HYDROCHLORIDE 0.5 MG: 1 INJECTION, SOLUTION INTRAMUSCULAR; INTRAVENOUS; SUBCUTANEOUS at 06:51

## 2021-06-15 RX ADMIN — ONDANSETRON 4 MG: 2 INJECTION INTRAMUSCULAR; INTRAVENOUS at 06:52

## 2021-06-15 NOTE — ED PROVIDER NOTES
MD ATTESTATION NOTE    The ANGELA and I have discussed this patient's history, physical exam, and treatment plan.  I have reviewed the documentation and personally had a face to face interaction with the patient. I affirm the documentation and agree with the treatment and plan.  The attached note describes my personal findings.      Pérez Hatfield is a 65 y.o. male who presents to the ED c/o right lateral lower rib pain/right upper quadrant pain.  Onset last Thursday.  Pain is constant and sharp.  Worse with moving his right arm in certain positions.  No fever, precordial chest pain, shortness of breath.      On exam:  Regular rate and rhythm  Delayed expiratory phase bilaterally  No rash overlying the chest wall or the abdomen  Right upper quadrant tenderness without rebound or guarding with some discomfort at the inferior costal margin laterally at the mid axillary line    Labs  Recent Results (from the past 24 hour(s))   ECG 12 Lead    Collection Time: 06/15/21  6:44 AM   Result Value Ref Range    QT Interval 364 ms   Comprehensive Metabolic Panel    Collection Time: 06/15/21  6:51 AM    Specimen: Blood   Result Value Ref Range    Glucose 86 65 - 99 mg/dL    BUN 21 8 - 23 mg/dL    Creatinine 1.02 0.76 - 1.27 mg/dL    Sodium 138 136 - 145 mmol/L    Potassium 4.0 3.5 - 5.2 mmol/L    Chloride 99 98 - 107 mmol/L    CO2 29.3 (H) 22.0 - 29.0 mmol/L    Calcium 9.1 8.6 - 10.5 mg/dL    Total Protein 7.0 6.0 - 8.5 g/dL    Albumin 4.20 3.50 - 5.20 g/dL    ALT (SGPT) 22 1 - 41 U/L    AST (SGOT) 17 1 - 40 U/L    Alkaline Phosphatase 64 39 - 117 U/L    Total Bilirubin 1.2 0.0 - 1.2 mg/dL    eGFR Non African Amer 73 >60 mL/min/1.73    Globulin 2.8 gm/dL    A/G Ratio 1.5 g/dL    BUN/Creatinine Ratio 20.6 7.0 - 25.0    Anion Gap 9.7 5.0 - 15.0 mmol/L   Lipase    Collection Time: 06/15/21  6:51 AM    Specimen: Blood   Result Value Ref Range    Lipase 22 13 - 60 U/L   Troponin    Collection Time: 06/15/21  6:51 AM    Specimen: Blood    Result Value Ref Range    Troponin T <0.010 0.000 - 0.030 ng/mL   CBC Auto Differential    Collection Time: 06/15/21  6:51 AM    Specimen: Blood   Result Value Ref Range    WBC 8.32 3.40 - 10.80 10*3/mm3    RBC 5.63 4.14 - 5.80 10*6/mm3    Hemoglobin 17.6 13.0 - 17.7 g/dL    Hematocrit 52.8 (H) 37.5 - 51.0 %    MCV 93.8 79.0 - 97.0 fL    MCH 31.3 26.6 - 33.0 pg    MCHC 33.3 31.5 - 35.7 g/dL    RDW 12.9 12.3 - 15.4 %    RDW-SD 44.6 37.0 - 54.0 fl    MPV 10.3 6.0 - 12.0 fL    Platelets 159 140 - 450 10*3/mm3    Neutrophil % 53.7 42.7 - 76.0 %    Lymphocyte % 33.3 19.6 - 45.3 %    Monocyte % 10.0 5.0 - 12.0 %    Eosinophil % 2.0 0.3 - 6.2 %    Basophil % 0.6 0.0 - 1.5 %    Immature Grans % 0.4 0.0 - 0.5 %    Neutrophils, Absolute 4.47 1.70 - 7.00 10*3/mm3    Lymphocytes, Absolute 2.77 0.70 - 3.10 10*3/mm3    Monocytes, Absolute 0.83 0.10 - 0.90 10*3/mm3    Eosinophils, Absolute 0.17 0.00 - 0.40 10*3/mm3    Basophils, Absolute 0.05 0.00 - 0.20 10*3/mm3    Immature Grans, Absolute 0.03 0.00 - 0.05 10*3/mm3    nRBC 0.0 0.0 - 0.2 /100 WBC       Radiology  XR Chest 1 View    Result Date: 6/15/2021  XR CHEST 1 VW-  Clinical: Right upper quadrant pain  COMPARISON CT scan of the chest 06/25/2021 and chest radiograph 05/12/2015  FINDINGS: Cardiac size within normal limits. No mediastinal or hilar abnormality. No pleural effusion, vascular congestion or acute airspace disease has developed.  CONCLUSION: No active disease of the chest  This report was finalized on 6/15/2021 7:27 AM by Dr. Olayinka Conklin M.D.        Medical Decision Making:  ED Course as of Toño 15 0925   Tue Toño 15, 2021   0832 D-Dimer, Quant(!): 1.22 [TD]   0850 I discussed the CT scan results with Dr. Pina, radiology. No acute findings.     [TD]   0921 Leukocytes, UA: Negative [TD]   0921 Nitrite, UA: Negative [TD]   0924 On repeat evaluation, pain is well controlled.  Discharge home.  I gave him good return precautions.    [TD]      ED Course User  Index  [TD] Rayray Goncalves II, MD       PPE: Both the patient and I wore a surgical mask throughout the entire patient encounter. I wore protective goggles.     Diagnosis  Final diagnoses:   Right upper quadrant abdominal pain        Rayray Goncalves II, MD  06/15/21 0954
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
## 2021-06-15 NOTE — ED NOTES
pt to ER via [facility] from home c/o right side pain onset last thursday denies N/V. pt in mask in triage triage in appropriate PPE     Jami Sanderson, RN  06/15/21 9458

## 2021-06-16 ENCOUNTER — PATIENT OUTREACH (OUTPATIENT)
Dept: CASE MANAGEMENT | Facility: OTHER | Age: 66
End: 2021-06-16

## 2021-06-16 NOTE — OUTREACH NOTE
Patient Outreach    Call to pt and introduced self and role of ACM. Pt states he is still having pain and does not know what happened in the ED. ACM clarified that pt does not know what they found. Explained to pt that lab work and CT scan was completed and MD found his workup to be negative for any emergent needs and advised that pt follow up with his PCP. Pt has not contacted but will do so today.  Discussed need for AWV.   Pt wondering if this could be COVID. ACM discussed common symptoms for COVID and that MD did not feel he needed to be tested. Pt states he does not want to take the vaccination because he feels the vaccination was produced to fast. Suggested to pt to do some research and to discuss with his PCP.   No questions, concerns or needs regarding health wellness. Pt given number for 24/7 hotliine. Pt appreciative of phone call and declined to participate in care advising program. No needs identified.

## 2021-06-17 ENCOUNTER — TELEPHONE (OUTPATIENT)
Dept: FAMILY MEDICINE CLINIC | Facility: CLINIC | Age: 66
End: 2021-06-17

## 2021-06-17 DIAGNOSIS — M25.519 SHOULDER PAIN, UNSPECIFIED CHRONICITY, UNSPECIFIED LATERALITY: Primary | ICD-10-CM

## 2021-06-17 RX ORDER — CYCLOBENZAPRINE HCL 10 MG
TABLET ORAL
Qty: 30 TABLET | Refills: 2 | Status: SHIPPED | OUTPATIENT
Start: 2021-06-17 | End: 2021-09-21

## 2021-06-17 RX ORDER — HYDROCODONE BITARTRATE AND ACETAMINOPHEN 7.5; 325 MG/1; MG/1
1 TABLET ORAL EVERY 4 HOURS PRN
Qty: 40 TABLET | Refills: 0
Start: 2021-06-17 | End: 2021-12-20

## 2021-06-17 NOTE — TELEPHONE ENCOUNTER
PT HAS SEVERE RIB PAIN. HE WENT TO THE ER BUT HE DOES NOT KNOW WHAT THE DIAGNOSIS WAS.  YOU DO NOT HAVE AN AVAILABLE APPT'S.  PLEASE ADVISE PT WHAT ER SAID AND WHAT YOU THINK HE NEEDS TO DO?  PLEASE CALL [phone]

## 2021-06-21 ENCOUNTER — TELEPHONE (OUTPATIENT)
Dept: FAMILY MEDICINE CLINIC | Facility: CLINIC | Age: 66
End: 2021-06-21

## 2021-06-21 ENCOUNTER — OFFICE VISIT (OUTPATIENT)
Dept: FAMILY MEDICINE CLINIC | Facility: CLINIC | Age: 66
End: 2021-06-21

## 2021-06-21 VITALS
RESPIRATION RATE: 20 BRPM | TEMPERATURE: 98.2 F | BODY MASS INDEX: 27.49 KG/M2 | WEIGHT: 214.2 LBS | HEART RATE: 84 BPM | DIASTOLIC BLOOD PRESSURE: 60 MMHG | OXYGEN SATURATION: 90 % | HEIGHT: 74 IN | SYSTOLIC BLOOD PRESSURE: 100 MMHG

## 2021-06-21 DIAGNOSIS — B02.9 HERPES ZOSTER WITHOUT COMPLICATION: Primary | ICD-10-CM

## 2021-06-21 PROCEDURE — 99213 OFFICE O/P EST LOW 20 MIN: CPT | Performed by: FAMILY MEDICINE

## 2021-06-21 RX ORDER — TRIAMCINOLONE ACETONIDE 1 MG/G
CREAM TOPICAL 2 TIMES DAILY
Qty: 80 G | Refills: 1 | Status: SHIPPED | OUTPATIENT
Start: 2021-06-21 | End: 2021-09-21

## 2021-06-21 RX ORDER — FAMCICLOVIR 500 MG/1
500 TABLET ORAL 3 TIMES DAILY
Qty: 21 TABLET | Refills: 0 | Status: SHIPPED | OUTPATIENT
Start: 2021-06-21 | End: 2021-09-21

## 2021-06-21 NOTE — TELEPHONE ENCOUNTER
Caller: Pérez Hatfield    Relationship to patient: Self    Best call back number: [phone]    Patient is needing: PATIENT IS REQUESTING TO SPEAK WITH KIKE , WAS TOLD TO CALL BACK TODAY PATIENT WOULD NOT DISCUSS FURTHER BUT TO SPEAK WITH KIKE

## 2021-06-21 NOTE — PROGRESS NOTES
HPI  Pérez Hatfield is a 65 y.o. male who is here for concerns of spider bites.  Started with some chest wall pain last week rash broke out 3 or 4 days ago.  Continues with pain.      Review of Systems   Skin: Positive for rash.   All other systems reviewed and are negative.        Past Medical History:   Diagnosis Date   • Acute hypercapnic respiratory failure (CMS/HCC) 2019   • COPD (chronic obstructive pulmonary disease) (CMS/HCC)    • Goiter    • Meniscus tear    • Multiple benign polyps of large intestine    • Wrist fracture        Past Surgical History:   Procedure Laterality Date   • COLONOSCOPY     • HERNIA REPAIR     • KNEE ARTHROPLASTY     • NECK SURGERY     • POLYPECTOMY     • TONSILLECTOMY     • WRIST SURGERY         Family History   Problem Relation Age of Onset   • Alzheimer's disease Other    • Thyroid disease Other    • Hypertension Other        Social History     Socioeconomic History   • Marital status: Single     Spouse name: Not on file   • Number of children: Not on file   • Years of education: Not on file   • Highest education level: Not on file   Tobacco Use   • Smoking status: Former Smoker     Packs/day: 1.00     Years: 10.00     Pack years: 10.00     Types: Cigarettes     Start date: 1976     Quit date: 1986     Years since quittin.4   • Smokeless tobacco: Never Used   • Tobacco comment: pt not sure of when he quit   Substance and Sexual Activity   • Alcohol use: Yes     Comment: social   • Drug use: No   • Sexual activity: Not Currently       Vitals:    21 1140   BP: 100/60   Pulse: 84   Resp: 20   Temp: 98.2 °F (36.8 °C)   SpO2: 90%        Body mass index is 27.5 kg/m².      Physical Exam  Vitals and nursing note reviewed.   Constitutional:       General: He is not in acute distress.     Appearance: He is well-developed.   HENT:      Head: Normocephalic and atraumatic.      Nose:      Comments: Patient with mask.  Provider with mask and shield  Eyes:       Conjunctiva/sclera: Conjunctivae normal.      Pupils: Pupils are equal, round, and reactive to light.   Neck:      Thyroid: No thyromegaly.   Cardiovascular:      Rate and Rhythm: Normal rate and regular rhythm.      Heart sounds: Normal heart sounds.   Pulmonary:      Effort: Pulmonary effort is normal. No respiratory distress.      Breath sounds: Normal breath sounds.   Abdominal:      General: There is no distension.      Palpations: Abdomen is soft. There is no mass.      Tenderness: There is no abdominal tenderness.      Hernia: No hernia is present.   Musculoskeletal:         General: No tenderness or deformity. Normal range of motion.      Cervical back: Normal range of motion.   Lymphadenopathy:      Cervical: No cervical adenopathy.   Skin:     General: Skin is warm and dry.      Coloration: Skin is not pale.      Findings: Lesion and rash present. Rash is crusting and vesicular.          Neurological:      Mental Status: He is alert and oriented to person, place, and time.      Motor: No abnormal muscle tone.      Coordination: Coordination normal.   Psychiatric:         Mood and Affect: Mood normal.         Behavior: Behavior normal.         Thought Content: Thought content normal.         Judgment: Judgment normal.           Assessment/Plan    Diagnoses and all orders for this visit:    1. Herpes zoster without complication (Primary)  -     famciclovir (FAMVIR) 500 MG tablet; Take 1 tablet by mouth 3 (Three) Times a Day. 3 tabs po stat prn fever blister  Dispense: 21 tablet; Refill: 0    Patient presents with obvious shingles outbreak affecting the right trunk as noted above.  Discussed natural history and possibility of persistent pain.  Will start antiviral medication.  Otherwise routine skin care and call if symptoms worsen.  Especially if pain persists or worsens may need to start gabapentin for similar medication?    This note includes information entered using a voice recognition dictation system.   Though reviewed, some nonsensible errors may remain.

## 2021-09-21 ENCOUNTER — OFFICE VISIT (OUTPATIENT)
Dept: FAMILY MEDICINE CLINIC | Facility: CLINIC | Age: 66
End: 2021-09-21

## 2021-09-21 VITALS
HEIGHT: 74 IN | RESPIRATION RATE: 18 BRPM | HEART RATE: 86 BPM | TEMPERATURE: 97.3 F | OXYGEN SATURATION: 90 % | SYSTOLIC BLOOD PRESSURE: 110 MMHG | WEIGHT: 215.8 LBS | DIASTOLIC BLOOD PRESSURE: 60 MMHG | BODY MASS INDEX: 27.7 KG/M2

## 2021-09-21 DIAGNOSIS — E04.1 SOLITARY THYROID NODULE: ICD-10-CM

## 2021-09-21 DIAGNOSIS — Z79.899 HIGH RISK MEDICATION USE: ICD-10-CM

## 2021-09-21 DIAGNOSIS — J44.9 CHRONIC OBSTRUCTIVE PULMONARY DISEASE, UNSPECIFIED COPD TYPE (HCC): Primary | ICD-10-CM

## 2021-09-21 DIAGNOSIS — R53.83 FATIGUE, UNSPECIFIED TYPE: ICD-10-CM

## 2021-09-21 PROCEDURE — 99213 OFFICE O/P EST LOW 20 MIN: CPT | Performed by: FAMILY MEDICINE

## 2021-09-21 NOTE — PROGRESS NOTES
HPI  Pérez Hatfield is a 66 y.o. male who is here for follow up of multiple ongoing medical issues.  Recently had shingles and seems to have recovered well though continues with some pain.  Discussed other health maintenance issues and reviewed lab work from 3 months ago while in hospital.  Complains of general fatigue and muscle weakness as well as some unsteadiness.      Review of Systems   Constitutional: Positive for fatigue.   Neurological: Positive for weakness.   All other systems reviewed and are negative.        Past Medical History:   Diagnosis Date   • Acute hypercapnic respiratory failure (CMS/HCC) 2019   • COPD (chronic obstructive pulmonary disease) (CMS/HCC)    • Goiter    • Meniscus tear    • Multiple benign polyps of large intestine    • Wrist fracture        Past Surgical History:   Procedure Laterality Date   • COLONOSCOPY     • HERNIA REPAIR     • KNEE ARTHROPLASTY     • NECK SURGERY     • POLYPECTOMY     • TONSILLECTOMY     • WRIST SURGERY         Family History   Problem Relation Age of Onset   • Alzheimer's disease Other    • Thyroid disease Other    • Hypertension Other        Social History     Socioeconomic History   • Marital status: Single     Spouse name: Not on file   • Number of children: Not on file   • Years of education: Not on file   • Highest education level: Not on file   Tobacco Use   • Smoking status: Former Smoker     Packs/day: 1.00     Years: 10.00     Pack years: 10.00     Types: Cigarettes     Start date: 1976     Quit date: 1986     Years since quittin.7   • Smokeless tobacco: Never Used   • Tobacco comment: pt not sure of when he quit   Substance and Sexual Activity   • Alcohol use: Yes     Comment: social   • Drug use: No   • Sexual activity: Not Currently       Vitals:    21 1508   BP: 110/60   Pulse: 86   Resp: 18   Temp: 97.3 °F (36.3 °C)   SpO2: 90%        Body mass index is 27.71 kg/m².      Physical Exam  Vitals and nursing note reviewed.    Constitutional:       General: He is not in acute distress.     Appearance: He is well-developed.   HENT:      Head: Normocephalic and atraumatic.      Nose:      Comments: Patient with mask.  Provider with mask and shield  Eyes:      Conjunctiva/sclera: Conjunctivae normal.      Pupils: Pupils are equal, round, and reactive to light.   Neck:      Thyroid: No thyromegaly.   Cardiovascular:      Rate and Rhythm: Normal rate and regular rhythm.      Heart sounds: Normal heart sounds.   Pulmonary:      Effort: Pulmonary effort is normal. No respiratory distress.      Breath sounds: Normal breath sounds.   Abdominal:      General: There is no distension.      Palpations: Abdomen is soft. There is no mass.      Tenderness: There is no abdominal tenderness.      Hernia: No hernia is present.   Musculoskeletal:         General: No tenderness or deformity. Normal range of motion.      Cervical back: Normal range of motion.   Lymphadenopathy:      Cervical: No cervical adenopathy.   Skin:     General: Skin is warm and dry.      Coloration: Skin is not pale.      Findings: No rash.   Neurological:      General: No focal deficit present.      Mental Status: He is alert and oriented to person, place, and time.      Motor: No abnormal muscle tone.      Coordination: Coordination normal.   Psychiatric:         Mood and Affect: Mood normal.         Behavior: Behavior normal.         Thought Content: Thought content normal.         Judgment: Judgment normal.           Assessment/Plan    Diagnoses and all orders for this visit:    1. Chronic obstructive pulmonary disease with acute exacerbation (CMS/HCC) (Primary)    2. Fatigue, unspecified type    3. Solitary thyroid nodule        Patient here for routine follow-up of above-noted medical issues.  Recently had shingles but seems to have improved significantly.  Some scarring noted mid back area.  General fatigue and muscle weakness as well as balance issues as discussed.  Will get  routine follow-up ultrasound of thyroid nodule and recommend follow-up appointment in 3 months for lab work as well as initial Medicare wellness evaluation.    Strongly recommended getting Covid vaccinations.    This note includes information entered using a voice recognition dictation system.

## 2021-12-20 ENCOUNTER — OFFICE VISIT (OUTPATIENT)
Dept: FAMILY MEDICINE CLINIC | Facility: CLINIC | Age: 66
End: 2021-12-20

## 2021-12-20 VITALS
DIASTOLIC BLOOD PRESSURE: 60 MMHG | SYSTOLIC BLOOD PRESSURE: 100 MMHG | BODY MASS INDEX: 26.95 KG/M2 | TEMPERATURE: 97.3 F | OXYGEN SATURATION: 90 % | RESPIRATION RATE: 18 BRPM | HEART RATE: 97 BPM | HEIGHT: 74 IN | WEIGHT: 210 LBS

## 2021-12-20 DIAGNOSIS — Z23 NEED FOR INFLUENZA VACCINATION: ICD-10-CM

## 2021-12-20 DIAGNOSIS — Z87.820 HISTORY OF MULTIPLE CONCUSSIONS: ICD-10-CM

## 2021-12-20 DIAGNOSIS — N48.6 PEYRONIE'S DISEASE: ICD-10-CM

## 2021-12-20 DIAGNOSIS — J44.1 CHRONIC OBSTRUCTIVE PULMONARY DISEASE WITH ACUTE EXACERBATION (HCC): ICD-10-CM

## 2021-12-20 DIAGNOSIS — E04.1 SOLITARY THYROID NODULE: ICD-10-CM

## 2021-12-20 DIAGNOSIS — Z00.00 MEDICARE ANNUAL WELLNESS VISIT, INITIAL: Primary | ICD-10-CM

## 2021-12-20 PROBLEM — R07.9 CHEST PAIN: Status: RESOLVED | Noted: 2019-02-16 | Resolved: 2021-12-20

## 2021-12-20 PROBLEM — K59.03 THERAPEUTIC OPIOID INDUCED CONSTIPATION: Status: RESOLVED | Noted: 2018-01-05 | Resolved: 2021-12-20

## 2021-12-20 PROBLEM — T40.2X5A THERAPEUTIC OPIOID INDUCED CONSTIPATION: Status: RESOLVED | Noted: 2018-01-05 | Resolved: 2021-12-20

## 2021-12-20 PROBLEM — B00.9 HERPES: Status: RESOLVED | Noted: 2017-01-23 | Resolved: 2021-12-20

## 2021-12-20 PROCEDURE — 90662 IIV NO PRSV INCREASED AG IM: CPT | Performed by: FAMILY MEDICINE

## 2021-12-20 PROCEDURE — 1170F FXNL STATUS ASSESSED: CPT | Performed by: FAMILY MEDICINE

## 2021-12-20 PROCEDURE — 96160 PT-FOCUSED HLTH RISK ASSMT: CPT | Performed by: FAMILY MEDICINE

## 2021-12-20 PROCEDURE — G0438 PPPS, INITIAL VISIT: HCPCS | Performed by: FAMILY MEDICINE

## 2021-12-20 PROCEDURE — 1159F MED LIST DOCD IN RCRD: CPT | Performed by: FAMILY MEDICINE

## 2021-12-20 PROCEDURE — G0008 ADMIN INFLUENZA VIRUS VAC: HCPCS | Performed by: FAMILY MEDICINE
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
## 2021-12-27 ENCOUNTER — APPOINTMENT (OUTPATIENT)
Dept: GENERAL RADIOLOGY | Facility: HOSPITAL | Age: 66
End: 2021-12-27

## 2021-12-27 ENCOUNTER — HOSPITAL ENCOUNTER (OUTPATIENT)
Facility: HOSPITAL | Age: 66
Setting detail: OBSERVATION
LOS: 1 days | Discharge: HOME | End: 2021-12-28
Attending: EMERGENCY MEDICINE | Admitting: HOSPITALIST

## 2021-12-27 DIAGNOSIS — J44.1 COPD EXACERBATION: ICD-10-CM

## 2021-12-27 DIAGNOSIS — J96.01 ACUTE HYPOXEMIC RESPIRATORY FAILURE DUE TO COVID-19: Primary | ICD-10-CM

## 2021-12-27 DIAGNOSIS — U07.1 ACUTE HYPOXEMIC RESPIRATORY FAILURE DUE TO COVID-19: Primary | ICD-10-CM

## 2021-12-27 LAB
ALBUMIN SERPL-MCNC: 4 G/DL (ref 3.5–5.2)
ALBUMIN/GLOB SERPL: 1.5 G/DL
ALP SERPL-CCNC: 79 U/L (ref 39–117)
ALT SERPL W P-5'-P-CCNC: 17 U/L (ref 1–41)
ANION GAP SERPL CALCULATED.3IONS-SCNC: 8.4 MMOL/L (ref 5–15)
AST SERPL-CCNC: 20 U/L (ref 1–40)
B PARAPERT DNA SPEC QL NAA+PROBE: NOT DETECTED
B PERT DNA SPEC QL NAA+PROBE: NOT DETECTED
BASOPHILS # BLD AUTO: 0.02 10*3/MM3 (ref 0–0.2)
BASOPHILS NFR BLD AUTO: 0.4 % (ref 0–1.5)
BILIRUB SERPL-MCNC: 0.3 MG/DL (ref 0–1.2)
BILIRUB UR QL STRIP: NEGATIVE
BUN SERPL-MCNC: 15 MG/DL (ref 8–23)
BUN/CREAT SERPL: 15 (ref 7–25)
C PNEUM DNA NPH QL NAA+NON-PROBE: NOT DETECTED
CALCIUM SPEC-SCNC: 8.9 MG/DL (ref 8.6–10.5)
CHLORIDE SERPL-SCNC: 98 MMOL/L (ref 98–107)
CLARITY UR: CLEAR
CO2 SERPL-SCNC: 29.6 MMOL/L (ref 22–29)
COLOR UR: YELLOW
CREAT SERPL-MCNC: 1 MG/DL (ref 0.76–1.27)
D-LACTATE SERPL-SCNC: 0.9 MMOL/L (ref 0.5–2)
DEPRECATED RDW RBC AUTO: 41.3 FL (ref 37–54)
EOSINOPHIL # BLD AUTO: 0.03 10*3/MM3 (ref 0–0.4)
EOSINOPHIL NFR BLD AUTO: 0.5 % (ref 0.3–6.2)
ERYTHROCYTE [DISTWIDTH] IN BLOOD BY AUTOMATED COUNT: 12 % (ref 12.3–15.4)
FLUAV SUBTYP SPEC NAA+PROBE: NOT DETECTED
FLUBV RNA ISLT QL NAA+PROBE: NOT DETECTED
GFR SERPL CREATININE-BSD FRML MDRD: 75 ML/MIN/1.73
GLOBULIN UR ELPH-MCNC: 2.7 GM/DL
GLUCOSE SERPL-MCNC: 99 MG/DL (ref 65–99)
GLUCOSE UR STRIP-MCNC: NEGATIVE MG/DL
HADV DNA SPEC NAA+PROBE: NOT DETECTED
HCOV 229E RNA SPEC QL NAA+PROBE: NOT DETECTED
HCOV HKU1 RNA SPEC QL NAA+PROBE: NOT DETECTED
HCOV NL63 RNA SPEC QL NAA+PROBE: NOT DETECTED
HCOV OC43 RNA SPEC QL NAA+PROBE: NOT DETECTED
HCT VFR BLD AUTO: 47.8 % (ref 37.5–51)
HGB BLD-MCNC: 16.3 G/DL (ref 13–17.7)
HGB UR QL STRIP.AUTO: NEGATIVE
HMPV RNA NPH QL NAA+NON-PROBE: NOT DETECTED
HOLD SPECIMEN: NORMAL
HOLD SPECIMEN: NORMAL
HPIV1 RNA ISLT QL NAA+PROBE: NOT DETECTED
HPIV2 RNA SPEC QL NAA+PROBE: NOT DETECTED
HPIV3 RNA NPH QL NAA+PROBE: NOT DETECTED
HPIV4 P GENE NPH QL NAA+PROBE: NOT DETECTED
KETONES UR QL STRIP: NEGATIVE
LEUKOCYTE ESTERASE UR QL STRIP.AUTO: NEGATIVE
LYMPHOCYTES # BLD AUTO: 1.34 10*3/MM3 (ref 0.7–3.1)
LYMPHOCYTES NFR BLD AUTO: 23.8 % (ref 19.6–45.3)
M PNEUMO IGG SER IA-ACNC: NOT DETECTED
MAGNESIUM SERPL-MCNC: 2.1 MG/DL (ref 1.6–2.4)
MCH RBC QN AUTO: 31.8 PG (ref 26.6–33)
MCHC RBC AUTO-ENTMCNC: 34.1 G/DL (ref 31.5–35.7)
MCV RBC AUTO: 93.4 FL (ref 79–97)
MONOCYTES # BLD AUTO: 1.11 10*3/MM3 (ref 0.1–0.9)
MONOCYTES NFR BLD AUTO: 19.8 % (ref 5–12)
NEUTROPHILS NFR BLD AUTO: 3.1 10*3/MM3 (ref 1.7–7)
NEUTROPHILS NFR BLD AUTO: 55.1 % (ref 42.7–76)
NITRITE UR QL STRIP: NEGATIVE
PH UR STRIP.AUTO: 5.5 [PH] (ref 5–8)
PLATELET # BLD AUTO: 126 10*3/MM3 (ref 140–450)
PMV BLD AUTO: 10.6 FL (ref 6–12)
POTASSIUM SERPL-SCNC: 4.1 MMOL/L (ref 3.5–5.2)
PROCALCITONIN SERPL-MCNC: 0.07 NG/ML (ref 0–0.25)
PROT SERPL-MCNC: 6.7 G/DL (ref 6–8.5)
PROT UR QL STRIP: NEGATIVE
QT INTERVAL: 342 MS
RBC # BLD AUTO: 5.12 10*6/MM3 (ref 4.14–5.8)
RHINOVIRUS RNA SPEC NAA+PROBE: NOT DETECTED
RSV RNA NPH QL NAA+NON-PROBE: NOT DETECTED
SARS-COV-2 RNA NPH QL NAA+NON-PROBE: DETECTED
SODIUM SERPL-SCNC: 136 MMOL/L (ref 136–145)
SP GR UR STRIP: 1.02 (ref 1–1.03)
TROPONIN T SERPL-MCNC: <0.01 NG/ML (ref 0–0.03)
UROBILINOGEN UR QL STRIP: NORMAL
WBC NRBC COR # BLD: 5.62 10*3/MM3 (ref 3.4–10.8)
WHOLE BLOOD HOLD SPECIMEN: NORMAL
WHOLE BLOOD HOLD SPECIMEN: NORMAL

## 2021-12-27 PROCEDURE — 80053 COMPREHEN METABOLIC PANEL: CPT | Performed by: EMERGENCY MEDICINE

## 2021-12-27 PROCEDURE — 84145 PROCALCITONIN (PCT): CPT | Performed by: PHYSICIAN ASSISTANT

## 2021-12-27 PROCEDURE — 93010 ELECTROCARDIOGRAM REPORT: CPT | Performed by: INTERNAL MEDICINE

## 2021-12-27 PROCEDURE — 83605 ASSAY OF LACTIC ACID: CPT | Performed by: PHYSICIAN ASSISTANT

## 2021-12-27 PROCEDURE — G0378 HOSPITAL OBSERVATION PER HR: HCPCS

## 2021-12-27 PROCEDURE — 87150 DNA/RNA AMPLIFIED PROBE: CPT | Performed by: PHYSICIAN ASSISTANT

## 2021-12-27 PROCEDURE — 93005 ELECTROCARDIOGRAM TRACING: CPT

## 2021-12-27 PROCEDURE — 87147 CULTURE TYPE IMMUNOLOGIC: CPT | Performed by: PHYSICIAN ASSISTANT

## 2021-12-27 PROCEDURE — 71045 X-RAY EXAM CHEST 1 VIEW: CPT

## 2021-12-27 PROCEDURE — 25010000002 DEXAMETHASONE PER 1 MG: Performed by: PHYSICIAN ASSISTANT

## 2021-12-27 PROCEDURE — 99284 EMERGENCY DEPT VISIT MOD MDM: CPT

## 2021-12-27 PROCEDURE — 81003 URINALYSIS AUTO W/O SCOPE: CPT | Performed by: EMERGENCY MEDICINE

## 2021-12-27 PROCEDURE — 0202U NFCT DS 22 TRGT SARS-COV-2: CPT | Performed by: PHYSICIAN ASSISTANT

## 2021-12-27 PROCEDURE — 96374 THER/PROPH/DIAG INJ IV PUSH: CPT

## 2021-12-27 PROCEDURE — 83735 ASSAY OF MAGNESIUM: CPT | Performed by: EMERGENCY MEDICINE

## 2021-12-27 PROCEDURE — 93005 ELECTROCARDIOGRAM TRACING: CPT | Performed by: EMERGENCY MEDICINE

## 2021-12-27 PROCEDURE — 84484 ASSAY OF TROPONIN QUANT: CPT | Performed by: EMERGENCY MEDICINE

## 2021-12-27 PROCEDURE — 87040 BLOOD CULTURE FOR BACTERIA: CPT | Performed by: PHYSICIAN ASSISTANT

## 2021-12-27 PROCEDURE — 85025 COMPLETE CBC W/AUTO DIFF WBC: CPT | Performed by: EMERGENCY MEDICINE

## 2021-12-27 RX ORDER — ONDANSETRON 4 MG/1
4 TABLET, FILM COATED ORAL EVERY 6 HOURS PRN
Status: DISCONTINUED | OUTPATIENT
Start: 2021-12-27 | End: 2021-12-28 | Stop reason: HOSPADM

## 2021-12-27 RX ORDER — IPRATROPIUM BROMIDE AND ALBUTEROL SULFATE 2.5; .5 MG/3ML; MG/3ML
3 SOLUTION RESPIRATORY (INHALATION)
Status: DISCONTINUED | OUTPATIENT
Start: 2021-12-27 | End: 2021-12-28 | Stop reason: HOSPADM

## 2021-12-27 RX ORDER — ACETAMINOPHEN 500 MG
1000 TABLET ORAL ONCE
Status: COMPLETED | OUTPATIENT
Start: 2021-12-27 | End: 2021-12-27

## 2021-12-27 RX ORDER — IPRATROPIUM BROMIDE AND ALBUTEROL SULFATE 2.5; .5 MG/3ML; MG/3ML
3 SOLUTION RESPIRATORY (INHALATION) EVERY 4 HOURS PRN
Status: DISCONTINUED | OUTPATIENT
Start: 2021-12-27 | End: 2021-12-28 | Stop reason: HOSPADM

## 2021-12-27 RX ORDER — NITROGLYCERIN 0.4 MG/1
0.4 TABLET SUBLINGUAL
Status: DISCONTINUED | OUTPATIENT
Start: 2021-12-27 | End: 2021-12-28 | Stop reason: HOSPADM

## 2021-12-27 RX ORDER — ACETAMINOPHEN 325 MG/1
650 TABLET ORAL EVERY 4 HOURS PRN
Status: DISCONTINUED | OUTPATIENT
Start: 2021-12-27 | End: 2021-12-28 | Stop reason: HOSPADM

## 2021-12-27 RX ORDER — ONDANSETRON 2 MG/ML
4 INJECTION INTRAMUSCULAR; INTRAVENOUS EVERY 6 HOURS PRN
Status: DISCONTINUED | OUTPATIENT
Start: 2021-12-27 | End: 2021-12-28 | Stop reason: HOSPADM

## 2021-12-27 RX ORDER — DEXAMETHASONE SODIUM PHOSPHATE 10 MG/ML
6 INJECTION INTRAMUSCULAR; INTRAVENOUS ONCE
Status: COMPLETED | OUTPATIENT
Start: 2021-12-27 | End: 2021-12-27

## 2021-12-27 RX ORDER — SODIUM CHLORIDE 0.9 % (FLUSH) 0.9 %
10 SYRINGE (ML) INJECTION AS NEEDED
Status: DISCONTINUED | OUTPATIENT
Start: 2021-12-27 | End: 2021-12-28 | Stop reason: HOSPADM

## 2021-12-27 RX ORDER — UREA 10 %
3 LOTION (ML) TOPICAL NIGHTLY PRN
Status: DISCONTINUED | OUTPATIENT
Start: 2021-12-27 | End: 2021-12-28 | Stop reason: HOSPADM

## 2021-12-27 RX ORDER — DEXAMETHASONE SODIUM PHOSPHATE 10 MG/ML
6 INJECTION INTRAMUSCULAR; INTRAVENOUS DAILY
Status: DISCONTINUED | OUTPATIENT
Start: 2021-12-27 | End: 2021-12-28 | Stop reason: HOSPADM

## 2021-12-27 RX ADMIN — ACETAMINOPHEN 1000 MG: 500 TABLET ORAL at 14:48

## 2021-12-27 RX ADMIN — DEXAMETHASONE SODIUM PHOSPHATE 6 MG: 10 INJECTION, SOLUTION INTRAMUSCULAR; INTRAVENOUS at 17:10

## 2021-12-27 RX ADMIN — SODIUM CHLORIDE 1000 ML: 9 INJECTION, SOLUTION INTRAVENOUS at 15:52

## 2021-12-27 RX ADMIN — SODIUM CHLORIDE 1000 ML: 9 INJECTION, SOLUTION INTRAVENOUS at 14:47

## 2021-12-28 ENCOUNTER — READMISSION MANAGEMENT (OUTPATIENT)
Dept: CALL CENTER | Facility: HOSPITAL | Age: 66
End: 2021-12-28

## 2021-12-28 VITALS
RESPIRATION RATE: 22 BRPM | HEART RATE: 77 BPM | WEIGHT: 209.7 LBS | HEIGHT: 75 IN | TEMPERATURE: 97.8 F | BODY MASS INDEX: 26.07 KG/M2 | SYSTOLIC BLOOD PRESSURE: 117 MMHG | DIASTOLIC BLOOD PRESSURE: 76 MMHG | OXYGEN SATURATION: 87 %

## 2021-12-28 LAB
ALBUMIN SERPL-MCNC: 3.4 G/DL (ref 3.5–5.2)
ALP SERPL-CCNC: 72 U/L (ref 39–117)
ALT SERPL W P-5'-P-CCNC: 16 U/L (ref 1–41)
ANION GAP SERPL CALCULATED.3IONS-SCNC: 9.8 MMOL/L (ref 5–15)
AST SERPL-CCNC: 19 U/L (ref 1–40)
BACTERIA BLD CULT: ABNORMAL
BILIRUB CONJ SERPL-MCNC: <0.2 MG/DL (ref 0–0.3)
BILIRUB INDIRECT SERPL-MCNC: ABNORMAL MG/DL
BILIRUB SERPL-MCNC: 0.2 MG/DL (ref 0–1.2)
BOTTLE TYPE: ABNORMAL
BUN SERPL-MCNC: 16 MG/DL (ref 8–23)
BUN/CREAT SERPL: 19 (ref 7–25)
CALCIUM SPEC-SCNC: 8.4 MG/DL (ref 8.6–10.5)
CHLORIDE SERPL-SCNC: 102 MMOL/L (ref 98–107)
CO2 SERPL-SCNC: 28.2 MMOL/L (ref 22–29)
CREAT SERPL-MCNC: 0.84 MG/DL (ref 0.76–1.27)
DEPRECATED RDW RBC AUTO: 41.7 FL (ref 37–54)
ERYTHROCYTE [DISTWIDTH] IN BLOOD BY AUTOMATED COUNT: 12 % (ref 12.3–15.4)
GFR SERPL CREATININE-BSD FRML MDRD: 91 ML/MIN/1.73
GLUCOSE SERPL-MCNC: 101 MG/DL (ref 65–99)
HCT VFR BLD AUTO: 44 % (ref 37.5–51)
HGB BLD-MCNC: 14.8 G/DL (ref 13–17.7)
MCH RBC QN AUTO: 31.2 PG (ref 26.6–33)
MCHC RBC AUTO-ENTMCNC: 33.6 G/DL (ref 31.5–35.7)
MCV RBC AUTO: 92.6 FL (ref 79–97)
PLATELET # BLD AUTO: 117 10*3/MM3 (ref 140–450)
PMV BLD AUTO: 10.8 FL (ref 6–12)
POTASSIUM SERPL-SCNC: 4.1 MMOL/L (ref 3.5–5.2)
PROT SERPL-MCNC: 6.2 G/DL (ref 6–8.5)
RBC # BLD AUTO: 4.75 10*6/MM3 (ref 4.14–5.8)
SODIUM SERPL-SCNC: 140 MMOL/L (ref 136–145)
WBC NRBC COR # BLD: 5.84 10*3/MM3 (ref 3.4–10.8)

## 2021-12-28 PROCEDURE — 36415 COLL VENOUS BLD VENIPUNCTURE: CPT | Performed by: INTERNAL MEDICINE

## 2021-12-28 PROCEDURE — 25010000002 ENOXAPARIN PER 10 MG: Performed by: INTERNAL MEDICINE

## 2021-12-28 PROCEDURE — G0378 HOSPITAL OBSERVATION PER HR: HCPCS

## 2021-12-28 PROCEDURE — 96376 TX/PRO/DX INJ SAME DRUG ADON: CPT

## 2021-12-28 PROCEDURE — 25010000002 DEXAMETHASONE PER 1 MG: Performed by: INTERNAL MEDICINE

## 2021-12-28 PROCEDURE — 96372 THER/PROPH/DIAG INJ SC/IM: CPT

## 2021-12-28 PROCEDURE — 85027 COMPLETE CBC AUTOMATED: CPT | Performed by: INTERNAL MEDICINE

## 2021-12-28 PROCEDURE — 80076 HEPATIC FUNCTION PANEL: CPT | Performed by: HOSPITALIST

## 2021-12-28 PROCEDURE — 80048 BASIC METABOLIC PNL TOTAL CA: CPT | Performed by: INTERNAL MEDICINE

## 2021-12-28 RX ORDER — PANTOPRAZOLE SODIUM 40 MG/1
40 TABLET, DELAYED RELEASE ORAL DAILY
Qty: 14 TABLET | Refills: 0 | Status: SHIPPED | OUTPATIENT
Start: 2021-12-28

## 2021-12-28 RX ORDER — DEXAMETHASONE 6 MG/1
6 TABLET ORAL
Qty: 8 TABLET | Refills: 0 | Status: SHIPPED | OUTPATIENT
Start: 2021-12-29 | End: 2021-12-28 | Stop reason: SDUPTHER

## 2021-12-28 RX ORDER — PANTOPRAZOLE SODIUM 40 MG/1
40 TABLET, DELAYED RELEASE ORAL DAILY
Qty: 14 TABLET | Refills: 0 | Status: SHIPPED | OUTPATIENT
Start: 2021-12-28 | End: 2021-12-28 | Stop reason: SDUPTHER

## 2021-12-28 RX ORDER — DEXAMETHASONE 6 MG/1
6 TABLET ORAL
Qty: 8 TABLET | Refills: 0 | Status: SHIPPED | OUTPATIENT
Start: 2021-12-29 | End: 2022-01-06

## 2021-12-28 RX ADMIN — DEXAMETHASONE SODIUM PHOSPHATE 6 MG: 10 INJECTION INTRAMUSCULAR; INTRAVENOUS at 08:16

## 2021-12-28 RX ADMIN — ENOXAPARIN SODIUM 40 MG: 40 INJECTION SUBCUTANEOUS at 00:48

## 2021-12-28 NOTE — OUTREACH NOTE
Prep Survey      Responses   [facility] patient discharged from? [place]   Is LACE score < 7 ? Yes   Emergency Room discharge w/ pulse ox? No   Eligibility [facility]   Date of Admission 12/27/21   Date of Discharge 12/28/21   Discharge Disposition Home or Self Care   Discharge diagnosis Acute hypoxemic respiratory failure due to COVID-19 (   Does the patient have one of the following disease processes/diagnoses(primary or secondary)? COVID-19   Does the patient have Home health ordered? No   Is there a DME ordered? Yes   What DME was ordered? [facility] [place]   Prep survey completed? Yes          Sonal Naylor RN

## 2021-12-29 ENCOUNTER — TRANSITIONAL CARE MANAGEMENT TELEPHONE ENCOUNTER (OUTPATIENT)
Dept: CALL CENTER | Facility: HOSPITAL | Age: 66
End: 2021-12-29

## 2021-12-29 LAB
BACTERIA SPEC AEROBE CULT: ABNORMAL
GRAM STN SPEC: ABNORMAL
GRAM STN SPEC: ABNORMAL
ISOLATED FROM: ABNORMAL

## 2021-12-29 NOTE — OUTREACH NOTE
Call Center TCM Note      Responses   [facility] patient discharged from? [place]   Does the patient have one of the following disease processes/diagnoses(primary or secondary)? COVID-19   COVID-19 underlying condition? None   TCM attempt successful? No   Unsuccessful attempts Attempt 2   Discharge diagnosis Acute hypoxemic respiratory failure due to COVID-19           Jeana Deleon RN    12/29/2021, 11:38 EST

## 2021-12-29 NOTE — OUTREACH NOTE
Call Center TCM Note      Responses   [facility] patient discharged from? [place]   Does the patient have one of the following disease processes/diagnoses(primary or secondary)? COVID-19   COVID-19 underlying condition? None   TCM attempt successful? No  [verbal release is over a year old]   Unsuccessful attempts Attempt 1   Call Status Left message   Discharge diagnosis Acute hypoxemic respiratory failure due to COVID-19           Jeana Deleon RN    12/29/2021, 11:06 EST

## 2021-12-30 ENCOUNTER — TRANSITIONAL CARE MANAGEMENT TELEPHONE ENCOUNTER (OUTPATIENT)
Dept: CALL CENTER | Facility: HOSPITAL | Age: 66
End: 2021-12-30

## 2021-12-30 NOTE — OUTREACH NOTE
Call Center TCM Note      Responses   [facility] patient discharged from? [place]   Does the patient have one of the following disease processes/diagnoses(primary or secondary)? COVID-19   COVID-19 underlying condition? None   TCM attempt successful? No   Unsuccessful attempts Attempt 3   Call Status Left message   Discharge diagnosis Acute hypoxemic respiratory failure due to COVID-19           Alba Braxton RN    12/30/2021, 14:32 EST

## 2021-12-31 ENCOUNTER — READMISSION MANAGEMENT (OUTPATIENT)
Dept: CALL CENTER | Facility: HOSPITAL | Age: 66
End: 2021-12-31

## 2021-12-31 NOTE — OUTREACH NOTE
COVID-19 Week 1 Survey      Responses   [facility] patient discharged from? [place]   Does the patient have one of the following disease processes/diagnoses(primary or secondary)? General Surgery   Call Number Call 2   Week 1 Call successful? No  [NO ANSWER AT CONTACT NUMBERS, ATTEMPTED X2]   Discharge diagnosis Acute hypoxemic respiratory failure due to COVID-19           Sia Rasmussen RN

## 2022-01-01 ENCOUNTER — READMISSION MANAGEMENT (OUTPATIENT)
Dept: CALL CENTER | Facility: HOSPITAL | Age: 67
End: 2022-01-01

## 2022-01-01 LAB — BACTERIA SPEC AEROBE CULT: NORMAL

## 2022-01-01 NOTE — OUTREACH NOTE
COVID-19 Week 1 Survey      Responses   [facility] patient discharged from? [place]   Does the patient have one of the following disease processes/diagnoses(primary or secondary)? COVID-19   COVID-19 underlying condition? None   Call Number Call 3   Week 1 Call successful? Yes   Call start time 1351   Call end time 1354   Discharge diagnosis Acute hypoxemic respiratory failure due to COVID-19    What is the patient's perception of their health status since discharge? Improving   Does the patient have any of the following symptoms? None   Pulse Ox monitoring Intermittent   O2 Sat comments 94% pulse ox on oxygen   O2 Sat education comments couple times a day   Is the patient/caregiver able to teach back steps to recovery at home? Eat a well-balance diet   COVID-19 call completed? Yes   Wrap up additional comments feels he is doing well          Yolande Seo RN

## 2022-01-04 ENCOUNTER — READMISSION MANAGEMENT (OUTPATIENT)
Dept: CALL CENTER | Facility: HOSPITAL | Age: 67
End: 2022-01-04

## 2022-01-04 NOTE — OUTREACH NOTE
COVID-19 Week 2 Survey      Responses   [facility] patient discharged from? [place]   Does the patient have one of the following disease processes/diagnoses(primary or secondary)? COVID-19   COVID-19 underlying condition? None   Call Number Call 1   COVID-19 Week 2: Call 1 attempt successful? Yes   Call start time 1325   Call end time 1341   Discharge diagnosis Acute hypoxemic respiratory failure due to COVID-19    Meds reviewed with patient/caregiver? Yes   Is the patient having any side effects they believe may be caused by any medication additions or changes? No   Does the patient have all medications ordered at discharge? Yes   Is the patient taking all medications as directed (includes completed medication regime)? Yes   Does the patient have a primary care provider?  Yes   Does the patient have an appointment with their PCP or specialist within 7 days of discharge? No   Nursing Interventions Educated patient on importance of making appointment,  Advised patient to make appointment   Has the patient kept scheduled appointments due by today? N/A   Comments States he doesn't want to follow up with his PCP   What DME was ordered? [facility] [place]   Has all DME been delivered? Yes   DME comments States they are suppose to bring him another tank because the one he has is empty but doing well with no oxygen   What is the patient's perception of their health status since discharge? Improving   Does the patient have any of the following symptoms? None   Pulse Ox monitoring Intermittent   O2 Sat comments Sates 95% on RA   O2 Sat: education provided Sat levels,  When to seek care,  Monitoring frequency   Is the patient/caregiver able to teach back steps to recovery at home? Eat a well-balance diet,  Make a list of questions for provider's appointment,  Set small, achievable goals for return to baseline health,  Rest and rebuild strength, gradually increase activity   If the patient is a current smoker, are they able to  teach back resources for cessation? Not a smoker   Is the patient/caregiver able to teach back the hierarchy of who to call/visit for symptoms/problems? PCP, Specialist, Home health nurse, Urgent Care, ED, 911 Yes   COVID-19 call completed? Yes   Wrap up additional comments Will change out tooth brush.  Pt had questions on getting vaccine.  Advised to speak with his pcp on when to do this.  States he is interested in getting vaccinated.           Estela Corbin LPN

## 2022-01-11 ENCOUNTER — TELEPHONE (OUTPATIENT)
Dept: FAMILY MEDICINE CLINIC | Facility: CLINIC | Age: 67
End: 2022-01-11

## 2022-01-11 RX ORDER — METHYLPREDNISOLONE 4 MG/1
TABLET ORAL
Qty: 21 TABLET | Refills: 0 | Status: SHIPPED | OUTPATIENT
Start: 2022-01-11

## 2022-01-11 NOTE — TELEPHONE ENCOUNTER
Medication requested (name and dose): DEXAMETHASONE     Pharmacy where request should be sent: [facility] - [place], [place] - [address] - [phone]  - [phone] FX     Additional details provided by patient: IT WAS PRESCRIBED AT THE HOSPITAL ABOUT A WEEK AND A HALF AGO DUE TO HIM HAVING COVID. HE STATED HE STILL HAS CONGESTION IN LUNGS. PT IS OUT OF MED     Best call back number: [phone]    Does the patient have less than a 3 day supply:  [x] Yes  [] No    Ro Sexton  01/11/22, 10:51 EST

## 2024-10-11 ENCOUNTER — TRANSCRIBE ORDERS (OUTPATIENT)
Dept: ADMINISTRATIVE | Facility: HOSPITAL | Age: 69
End: 2024-10-11
Payer: MEDICARE

## 2024-10-11 DIAGNOSIS — R91.1 PULMONARY NODULE: Primary | ICD-10-CM

## 2024-11-15 ENCOUNTER — TRANSCRIBE ORDERS (OUTPATIENT)
Dept: ADMINISTRATIVE | Facility: HOSPITAL | Age: 69
End: 2024-11-15
Payer: MEDICARE

## 2024-11-15 DIAGNOSIS — R91.1 PULMONARY NODULE: Primary | ICD-10-CM

## 2025-02-01 ENCOUNTER — HOSPITAL ENCOUNTER (OUTPATIENT)
Facility: HOSPITAL | Age: 70
Discharge: HOME | End: 2025-02-01
Payer: MEDICARE

## 2025-02-01 DIAGNOSIS — R91.1 PULMONARY NODULE: ICD-10-CM

## 2025-02-01 PROCEDURE — 71250 CT THORAX DX C-: CPT

## 2025-03-29 ENCOUNTER — APPOINTMENT (OUTPATIENT)
Dept: GENERAL RADIOLOGY | Facility: HOSPITAL | Age: 70
End: 2025-03-29
Payer: MEDICARE

## 2025-03-29 ENCOUNTER — HOSPITAL ENCOUNTER (INPATIENT)
Facility: HOSPITAL | Age: 70
LOS: 2 days | Discharge: HOME | End: 2025-03-31
Attending: EMERGENCY MEDICINE | Admitting: HOSPITALIST
Payer: MEDICARE

## 2025-03-29 DIAGNOSIS — R06.02 SHORTNESS OF BREATH: ICD-10-CM

## 2025-03-29 DIAGNOSIS — U07.1 ACUTE HYPOXEMIC RESPIRATORY FAILURE DUE TO COVID-19: ICD-10-CM

## 2025-03-29 DIAGNOSIS — J96.01 ACUTE HYPOXEMIC RESPIRATORY FAILURE DUE TO COVID-19: ICD-10-CM

## 2025-03-29 DIAGNOSIS — J10.1 INFLUENZA A: Primary | ICD-10-CM

## 2025-03-29 DIAGNOSIS — R09.02 HYPOXIA: ICD-10-CM

## 2025-03-29 DIAGNOSIS — J44.1 COPD EXACERBATION: ICD-10-CM

## 2025-03-29 LAB
ANION GAP SERPL CALCULATED.3IONS-SCNC: 8.1 MMOL/L (ref 5–15)
B PARAPERT DNA SPEC QL NAA+PROBE: NOT DETECTED
B PERT DNA SPEC QL NAA+PROBE: NOT DETECTED
BASOPHILS # BLD AUTO: 0.03 10*3/MM3 (ref 0–0.2)
BASOPHILS NFR BLD AUTO: 0.3 % (ref 0–1.5)
BUN SERPL-MCNC: 12 MG/DL (ref 8–23)
BUN/CREAT SERPL: 11.9 (ref 7–25)
C PNEUM DNA NPH QL NAA+NON-PROBE: NOT DETECTED
CALCIUM SPEC-SCNC: 8.9 MG/DL (ref 8.6–10.5)
CHLORIDE SERPL-SCNC: 101 MMOL/L (ref 98–107)
CO2 SERPL-SCNC: 27.9 MMOL/L (ref 22–29)
CREAT SERPL-MCNC: 1.01 MG/DL (ref 0.76–1.27)
D-LACTATE SERPL-SCNC: 2.1 MMOL/L (ref 0.5–2)
D-LACTATE SERPL-SCNC: 2.2 MMOL/L (ref 0.5–2)
D-LACTATE SERPL-SCNC: 2.5 MMOL/L (ref 0.5–2)
D-LACTATE SERPL-SCNC: 3.6 MMOL/L (ref 0.5–2)
DEPRECATED RDW RBC AUTO: 44.2 FL (ref 37–54)
EGFRCR SERPLBLD CKD-EPI 2021: 80.5 ML/MIN/1.73
EOSINOPHIL # BLD AUTO: 0.04 10*3/MM3 (ref 0–0.4)
EOSINOPHIL NFR BLD AUTO: 0.4 % (ref 0.3–6.2)
ERYTHROCYTE [DISTWIDTH] IN BLOOD BY AUTOMATED COUNT: 12.7 % (ref 12.3–15.4)
FLUAV H1 2009 PAND RNA NPH QL NAA+PROBE: DETECTED
FLUBV RNA ISLT QL NAA+PROBE: NOT DETECTED
GEN 5 1HR TROPONIN T REFLEX: 8 NG/L
GLUCOSE SERPL-MCNC: 106 MG/DL (ref 65–99)
HADV DNA SPEC NAA+PROBE: NOT DETECTED
HCOV 229E RNA SPEC QL NAA+PROBE: NOT DETECTED
HCOV HKU1 RNA SPEC QL NAA+PROBE: NOT DETECTED
HCOV NL63 RNA SPEC QL NAA+PROBE: NOT DETECTED
HCOV OC43 RNA SPEC QL NAA+PROBE: NOT DETECTED
HCT VFR BLD AUTO: 47.7 % (ref 37.5–51)
HGB BLD-MCNC: 15.9 G/DL (ref 13–17.7)
HMPV RNA NPH QL NAA+NON-PROBE: NOT DETECTED
HOLD SPECIMEN: NORMAL
HOLD SPECIMEN: NORMAL
HPIV1 RNA ISLT QL NAA+PROBE: NOT DETECTED
HPIV2 RNA SPEC QL NAA+PROBE: NOT DETECTED
HPIV3 RNA NPH QL NAA+PROBE: NOT DETECTED
HPIV4 P GENE NPH QL NAA+PROBE: NOT DETECTED
IMM GRANULOCYTES # BLD AUTO: 0.06 10*3/MM3 (ref 0–0.05)
IMM GRANULOCYTES NFR BLD AUTO: 0.6 % (ref 0–0.5)
LYMPHOCYTES # BLD AUTO: 0.74 10*3/MM3 (ref 0.7–3.1)
LYMPHOCYTES NFR BLD AUTO: 7.1 % (ref 19.6–45.3)
M PNEUMO IGG SER IA-ACNC: NOT DETECTED
MAGNESIUM SERPL-MCNC: 1.8 MG/DL (ref 1.6–2.4)
MCH RBC QN AUTO: 31.7 PG (ref 26.6–33)
MCHC RBC AUTO-ENTMCNC: 33.3 G/DL (ref 31.5–35.7)
MCV RBC AUTO: 95.2 FL (ref 79–97)
MONOCYTES # BLD AUTO: 1.02 10*3/MM3 (ref 0.1–0.9)
MONOCYTES NFR BLD AUTO: 9.8 % (ref 5–12)
NEUTROPHILS NFR BLD AUTO: 8.57 10*3/MM3 (ref 1.7–7)
NEUTROPHILS NFR BLD AUTO: 81.8 % (ref 42.7–76)
NRBC BLD AUTO-RTO: 0 /100 WBC (ref 0–0.2)
PLATELET # BLD AUTO: 175 10*3/MM3 (ref 140–450)
PMV BLD AUTO: 9.9 FL (ref 6–12)
POTASSIUM SERPL-SCNC: 4.2 MMOL/L (ref 3.5–5.2)
PROCALCITONIN SERPL-MCNC: 0.04 NG/ML (ref 0–0.25)
QT INTERVAL: 303 MS
QTC INTERVAL: 415 MS
RBC # BLD AUTO: 5.01 10*6/MM3 (ref 4.14–5.8)
RHINOVIRUS RNA SPEC NAA+PROBE: NOT DETECTED
RSV RNA NPH QL NAA+NON-PROBE: NOT DETECTED
SARS-COV-2 RNA NPH QL NAA+NON-PROBE: NOT DETECTED
SODIUM SERPL-SCNC: 137 MMOL/L (ref 136–145)
TROPONIN T NUMERIC DELTA: 1 NG/L
TROPONIN T SERPL HS-MCNC: 7 NG/L
WBC NRBC COR # BLD AUTO: 10.46 10*3/MM3 (ref 3.4–10.8)
WHOLE BLOOD HOLD COAG: NORMAL
WHOLE BLOOD HOLD SPECIMEN: NORMAL

## 2025-03-29 PROCEDURE — 80048 BASIC METABOLIC PNL TOTAL CA: CPT | Performed by: EMERGENCY MEDICINE

## 2025-03-29 PROCEDURE — 84145 PROCALCITONIN (PCT): CPT | Performed by: EMERGENCY MEDICINE

## 2025-03-29 PROCEDURE — 36415 COLL VENOUS BLD VENIPUNCTURE: CPT | Performed by: EMERGENCY MEDICINE

## 2025-03-29 PROCEDURE — 25010000002 METHYLPREDNISOLONE PER 125 MG: Performed by: EMERGENCY MEDICINE

## 2025-03-29 PROCEDURE — 94640 AIRWAY INHALATION TREATMENT: CPT

## 2025-03-29 PROCEDURE — 93010 ELECTROCARDIOGRAM REPORT: CPT | Performed by: INTERNAL MEDICINE

## 2025-03-29 PROCEDURE — 85025 COMPLETE CBC W/AUTO DIFF WBC: CPT | Performed by: EMERGENCY MEDICINE

## 2025-03-29 PROCEDURE — 25810000003 SODIUM CHLORIDE 0.9 % SOLUTION: Performed by: NURSE PRACTITIONER

## 2025-03-29 PROCEDURE — 93005 ELECTROCARDIOGRAM TRACING: CPT | Performed by: EMERGENCY MEDICINE

## 2025-03-29 PROCEDURE — 99291 CRITICAL CARE FIRST HOUR: CPT

## 2025-03-29 PROCEDURE — 84484 ASSAY OF TROPONIN QUANT: CPT | Performed by: HOSPITALIST

## 2025-03-29 PROCEDURE — 94760 N-INVAS EAR/PLS OXIMETRY 1: CPT

## 2025-03-29 PROCEDURE — 0202U NFCT DS 22 TRGT SARS-COV-2: CPT | Performed by: EMERGENCY MEDICINE

## 2025-03-29 PROCEDURE — 71045 X-RAY EXAM CHEST 1 VIEW: CPT

## 2025-03-29 PROCEDURE — 94799 UNLISTED PULMONARY SVC/PX: CPT

## 2025-03-29 PROCEDURE — 94761 N-INVAS EAR/PLS OXIMETRY MLT: CPT

## 2025-03-29 PROCEDURE — 93005 ELECTROCARDIOGRAM TRACING: CPT | Performed by: HOSPITALIST

## 2025-03-29 PROCEDURE — 83735 ASSAY OF MAGNESIUM: CPT | Performed by: EMERGENCY MEDICINE

## 2025-03-29 PROCEDURE — 25810000003 SODIUM CHLORIDE 0.9 % SOLUTION: Performed by: EMERGENCY MEDICINE

## 2025-03-29 PROCEDURE — 83605 ASSAY OF LACTIC ACID: CPT | Performed by: EMERGENCY MEDICINE

## 2025-03-29 RX ORDER — METHYLPREDNISOLONE SODIUM SUCCINATE 125 MG/2ML
125 INJECTION, POWDER, LYOPHILIZED, FOR SOLUTION INTRAMUSCULAR; INTRAVENOUS ONCE
Status: COMPLETED | OUTPATIENT
Start: 2025-03-29 | End: 2025-03-29

## 2025-03-29 RX ORDER — BUDESONIDE AND FORMOTEROL FUMARATE DIHYDRATE 160; 4.5 UG/1; UG/1
2 AEROSOL RESPIRATORY (INHALATION)
Status: DISCONTINUED | OUTPATIENT
Start: 2025-03-29 | End: 2025-03-31 | Stop reason: HOSPADM

## 2025-03-29 RX ORDER — IPRATROPIUM BROMIDE AND ALBUTEROL SULFATE 2.5; .5 MG/3ML; MG/3ML
3 SOLUTION RESPIRATORY (INHALATION)
Status: DISCONTINUED | OUTPATIENT
Start: 2025-03-29 | End: 2025-03-31 | Stop reason: HOSPADM

## 2025-03-29 RX ORDER — POLYETHYLENE GLYCOL 3350 17 G/17G
17 POWDER, FOR SOLUTION ORAL DAILY PRN
Status: DISCONTINUED | OUTPATIENT
Start: 2025-03-29 | End: 2025-03-31 | Stop reason: HOSPADM

## 2025-03-29 RX ORDER — AMOXICILLIN 250 MG
2 CAPSULE ORAL 2 TIMES DAILY PRN
Status: DISCONTINUED | OUTPATIENT
Start: 2025-03-29 | End: 2025-03-31 | Stop reason: HOSPADM

## 2025-03-29 RX ORDER — ACETAMINOPHEN 500 MG
1000 TABLET ORAL ONCE
Status: COMPLETED | OUTPATIENT
Start: 2025-03-29 | End: 2025-03-29

## 2025-03-29 RX ORDER — PREDNISONE 10 MG/1
10 TABLET ORAL DAILY
COMMUNITY
End: 2025-03-31 | Stop reason: HOSPADM

## 2025-03-29 RX ORDER — ALBUTEROL SULFATE 0.63 MG/3ML
0.63 SOLUTION RESPIRATORY (INHALATION) EVERY 6 HOURS PRN
Status: DISCONTINUED | OUTPATIENT
Start: 2025-03-29 | End: 2025-03-31 | Stop reason: HOSPADM

## 2025-03-29 RX ORDER — OSELTAMIVIR PHOSPHATE 75 MG/1
75 CAPSULE ORAL EVERY 12 HOURS SCHEDULED
Status: DISCONTINUED | OUTPATIENT
Start: 2025-03-29 | End: 2025-03-31 | Stop reason: HOSPADM

## 2025-03-29 RX ORDER — PANTOPRAZOLE SODIUM 40 MG/1
40 TABLET, DELAYED RELEASE ORAL DAILY
Status: DISCONTINUED | OUTPATIENT
Start: 2025-03-29 | End: 2025-03-31 | Stop reason: HOSPADM

## 2025-03-29 RX ORDER — BISACODYL 5 MG/1
5 TABLET, DELAYED RELEASE ORAL DAILY PRN
Status: DISCONTINUED | OUTPATIENT
Start: 2025-03-29 | End: 2025-03-31 | Stop reason: HOSPADM

## 2025-03-29 RX ORDER — NITROGLYCERIN 0.4 MG/1
0.4 TABLET SUBLINGUAL
Status: DISCONTINUED | OUTPATIENT
Start: 2025-03-29 | End: 2025-03-31 | Stop reason: HOSPADM

## 2025-03-29 RX ORDER — SODIUM CHLORIDE 9 MG/ML
40 INJECTION, SOLUTION INTRAVENOUS AS NEEDED
Status: DISCONTINUED | OUTPATIENT
Start: 2025-03-29 | End: 2025-03-31 | Stop reason: HOSPADM

## 2025-03-29 RX ORDER — OSELTAMIVIR PHOSPHATE 75 MG/1
75 CAPSULE ORAL ONCE
Status: COMPLETED | OUTPATIENT
Start: 2025-03-29 | End: 2025-03-29

## 2025-03-29 RX ORDER — ACETAMINOPHEN 160 MG/5ML
650 SOLUTION ORAL EVERY 4 HOURS PRN
Status: DISCONTINUED | OUTPATIENT
Start: 2025-03-29 | End: 2025-03-31 | Stop reason: HOSPADM

## 2025-03-29 RX ORDER — SODIUM CHLORIDE 0.9 % (FLUSH) 0.9 %
10 SYRINGE (ML) INJECTION EVERY 12 HOURS SCHEDULED
Status: DISCONTINUED | OUTPATIENT
Start: 2025-03-29 | End: 2025-03-31 | Stop reason: HOSPADM

## 2025-03-29 RX ORDER — ACETAMINOPHEN 650 MG/1
650 SUPPOSITORY RECTAL EVERY 4 HOURS PRN
Status: DISCONTINUED | OUTPATIENT
Start: 2025-03-29 | End: 2025-03-31 | Stop reason: HOSPADM

## 2025-03-29 RX ORDER — ACETAMINOPHEN 325 MG/1
650 TABLET ORAL EVERY 4 HOURS PRN
Status: DISCONTINUED | OUTPATIENT
Start: 2025-03-29 | End: 2025-03-31 | Stop reason: HOSPADM

## 2025-03-29 RX ORDER — ONDANSETRON 2 MG/ML
4 INJECTION INTRAMUSCULAR; INTRAVENOUS EVERY 6 HOURS PRN
Status: DISCONTINUED | OUTPATIENT
Start: 2025-03-29 | End: 2025-03-31 | Stop reason: HOSPADM

## 2025-03-29 RX ORDER — COSYNTROPIN 0.25 MG/ML
0.25 INJECTION, POWDER, FOR SOLUTION INTRAMUSCULAR; INTRAVENOUS ONCE
Status: COMPLETED | OUTPATIENT
Start: 2025-03-30 | End: 2025-03-30

## 2025-03-29 RX ORDER — SODIUM CHLORIDE 0.9 % (FLUSH) 0.9 %
10 SYRINGE (ML) INJECTION AS NEEDED
Status: DISCONTINUED | OUTPATIENT
Start: 2025-03-29 | End: 2025-03-31 | Stop reason: HOSPADM

## 2025-03-29 RX ORDER — ONDANSETRON 4 MG/1
4 TABLET, ORALLY DISINTEGRATING ORAL EVERY 6 HOURS PRN
Status: DISCONTINUED | OUTPATIENT
Start: 2025-03-29 | End: 2025-03-31 | Stop reason: HOSPADM

## 2025-03-29 RX ORDER — SODIUM CHLORIDE 9 MG/ML
100 INJECTION, SOLUTION INTRAVENOUS CONTINUOUS
Status: ACTIVE | OUTPATIENT
Start: 2025-03-29 | End: 2025-03-30

## 2025-03-29 RX ORDER — TRAMADOL HYDROCHLORIDE 50 MG/1
25 TABLET ORAL ONCE
Status: COMPLETED | OUTPATIENT
Start: 2025-03-30 | End: 2025-03-29

## 2025-03-29 RX ORDER — IPRATROPIUM BROMIDE AND ALBUTEROL SULFATE 2.5; .5 MG/3ML; MG/3ML
3 SOLUTION RESPIRATORY (INHALATION) ONCE
Status: COMPLETED | OUTPATIENT
Start: 2025-03-29 | End: 2025-03-29

## 2025-03-29 RX ORDER — BISACODYL 10 MG
10 SUPPOSITORY, RECTAL RECTAL DAILY PRN
Status: DISCONTINUED | OUTPATIENT
Start: 2025-03-29 | End: 2025-03-31 | Stop reason: HOSPADM

## 2025-03-29 RX ADMIN — OSELTAMIVIR PHOSPHATE 75 MG: 75 CAPSULE ORAL at 20:34

## 2025-03-29 RX ADMIN — IPRATROPIUM BROMIDE AND ALBUTEROL SULFATE 3 ML: .5; 3 SOLUTION RESPIRATORY (INHALATION) at 19:36

## 2025-03-29 RX ADMIN — METHYLPREDNISOLONE SODIUM SUCCINATE 125 MG: 125 INJECTION, POWDER, FOR SOLUTION INTRAMUSCULAR; INTRAVENOUS at 11:35

## 2025-03-29 RX ADMIN — Medication 10 ML: at 20:59

## 2025-03-29 RX ADMIN — SODIUM CHLORIDE 100 ML/HR: 9 INJECTION, SOLUTION INTRAVENOUS at 22:30

## 2025-03-29 RX ADMIN — SODIUM CHLORIDE 1000 ML: 900 INJECTION, SOLUTION INTRAVENOUS at 13:02

## 2025-03-29 RX ADMIN — ACETAMINOPHEN 650 MG: 325 TABLET, FILM COATED ORAL at 17:39

## 2025-03-29 RX ADMIN — PANTOPRAZOLE SODIUM 40 MG: 40 TABLET, DELAYED RELEASE ORAL at 17:24

## 2025-03-29 RX ADMIN — SENNOSIDES AND DOCUSATE SODIUM 2 TABLET: 50; 8.6 TABLET ORAL at 20:34

## 2025-03-29 RX ADMIN — ACETAMINOPHEN 650 MG: 325 TABLET, FILM COATED ORAL at 22:30

## 2025-03-29 RX ADMIN — TRAMADOL HYDROCHLORIDE 25 MG: 50 TABLET, COATED ORAL at 23:25

## 2025-03-29 RX ADMIN — ACETAMINOPHEN 1000 MG: 500 TABLET, FILM COATED ORAL at 11:14

## 2025-03-29 RX ADMIN — IPRATROPIUM BROMIDE AND ALBUTEROL SULFATE 3 ML: .5; 3 SOLUTION RESPIRATORY (INHALATION) at 11:49

## 2025-03-29 RX ADMIN — OSELTAMIVIR PHOSPHATE 75 MG: 75 CAPSULE ORAL at 12:52

## 2025-03-29 RX ADMIN — BUDESONIDE AND FORMOTEROL FUMARATE DIHYDRATE 2 PUFF: 160; 4.5 AEROSOL RESPIRATORY (INHALATION) at 19:37

## 2025-03-29 RX ADMIN — SODIUM CHLORIDE 1000 ML: 0.9 INJECTION, SOLUTION INTRAVENOUS at 14:01

## 2025-03-29 NOTE — H&P
HISTORY AND PHYSICAL   [facility]        Patient Identification:  Name: Pérez Hatfield  Age: 69 y.o.  Sex: male  :  1955  MRN: [id number]                     Primary Care Physician: Tuan Champion MD    Chief Complaint: Short of breath    History of Present Illness:   69-year-old gentleman with history of COPD presents complaining of increasing shortness of breath.  History comes from the patient himself and the niece at bedside.  He states his problem started about 2 days ago but the niece states he has been complaining for at least 4 days.  It started with generalized myalgias, fevers, headache and increased nonproductive cough.  Last 3 to 4 days he has been noticing increasing shortness of air and presented to the emergency room for further evaluation and treatment.  Compliance with medications have's been questionable.  His niece states that he is not on home O2 but plans were actually to get him set up for this.  She notes that he was prescribed steroids in February but is uncertain as to whether he is ever taken them or started taking them and then quit.  Patient was treated with IV steroids emergency room as well as bronchodilator treatments and presently is feeling much better.  Also noted to be hypotensive in the emergency room.  Patient denies lightheaded spells.  He does not get flu vaccines.      Past Medical History:  Past Medical History:   Diagnosis Date    Acute hypercapnic respiratory failure 2019    Chest pain 2019    COPD (chronic obstructive pulmonary disease)     Goiter     Herpes 2017    Meniscus tear     Multiple benign polyps of large intestine     Wrist fracture      Past Surgical History:  Past Surgical History:   Procedure Laterality Date    COLONOSCOPY      HERNIA REPAIR      KNEE ARTHROPLASTY      NECK SURGERY      POLYPECTOMY      TONSILLECTOMY      WRIST SURGERY        Home Meds:  Medications Prior to Admission   Medication Sig Dispense Refill  Last Dose/Taking    ipratropium-albuterol (DUO-NEB) 0.5-2.5 mg/mL nebulizer 3 (three) times a day.       methylPREDNISolone (Medrol) 4 MG dose pack follow package directions 21 tablet 0     pantoprazole (Protonix) 40 MG EC tablet Take 1 tablet by mouth Daily. 14 tablet 0     predniSONE (DELTASONE) 10 MG tablet Take 1 tablet by mouth Daily.   Unknown    VENTOLIN  (90 BASE) MCG/ACT inhaler inhale 2 puffs every 4 hours if needed  1        Allergies:  No Known Allergies  Immunizations:  Immunization History   Administered Date(s) Administered    Flu Vaccine Intradermal Quad 18-64YR 10/28/2019    Flu Vaccine Quad PF >36MO 2017, 2018    Fluad Quad 65+ 2020    Fluzone (or Fluarix & Flulaval for VFC) >6mos 2017, 2018    Fluzone High-Dose 65+yrs 2021    Fluzone Quad >6mos (Multi-dose) 2017    Pneumococcal Polysaccharide (PPSV23) 2020    flucelvax quad pfs =>4 YRS 10/28/2019     Social History:   Social History     Social History Narrative    Not on file     Social History     Tobacco Use    Smoking status: Former     Current packs/day: 0.00     Average packs/day: 1 pack/day for 10.0 years (10.0 ttl pk-yrs)     Types: Cigarettes     Start date: 1976     Quit date: 1986     Years since quittin.2    Smokeless tobacco: Never    Tobacco comments:     pt not sure of when he quit   Substance Use Topics    Alcohol use: Yes     Comment: social     Family History:  Family History   Problem Relation Age of Onset    Alzheimer's disease Other     Thyroid disease Other     Hypertension Other         Review of Systems  Review of Systems   Constitutional:         As per history of present illness.   HENT: Negative.     Eyes: Negative.    Respiratory:          As per history of present illness.   Cardiovascular: Negative.    Gastrointestinal: Negative.    Endocrine: Negative.    Genitourinary: Negative.    Musculoskeletal:         As per history of present illness.   Skin:  Negative.    Allergic/Immunologic: Negative.    Neurological: Negative.    Hematological: Negative.    Psychiatric/Behavioral: Negative.         Objective:  T Max 24 hrs: Temp (24hrs), Av.4 °F (37.4 °C), Min:98.8 °F (37.1 °C), Max:100.2 °F (37.9 °C)    Vitals Ranges:   Temp:  [98.8 °F (37.1 °C)-100.2 °F (37.9 °C)] 98.8 °F (37.1 °C)  Heart Rate:  [] 89  Resp:  [16-24] 22  BP: ()/(55-75) 101/61      Exam:  Physical Exam  Constitutional:       General: He is not in acute distress.     Appearance: Normal appearance. He is normal weight. He is not ill-appearing, toxic-appearing or diaphoretic.   HENT:      Head: Normocephalic and atraumatic.      Right Ear: External ear normal.      Left Ear: External ear normal.      Nose: Nose normal.      Mouth/Throat:      Mouth: Mucous membranes are moist.   Eyes:      Extraocular Movements: Extraocular movements intact.      Conjunctiva/sclera: Conjunctivae normal.   Cardiovascular:      Rate and Rhythm: Normal rate and regular rhythm.      Heart sounds:      No friction rub. No gallop.   Pulmonary:      Effort: Pulmonary effort is normal.      Breath sounds: Normal breath sounds.      Comments: Surprisingly benign pulmonary exam at present.  Lungs are clear with good air movement.  He has been on room air for at least 10 minutes, has gone to the bathroom and back and his O2 saturation was 91%.  Abdominal:      General: Abdomen is flat. Bowel sounds are normal. There is no distension.      Palpations: Abdomen is soft. There is no mass.      Tenderness: There is no abdominal tenderness. There is no guarding or rebound.   Musculoskeletal:      Cervical back: Neck supple.      Right lower leg: No edema.      Left lower leg: No edema.   Skin:     General: Skin is warm and dry.   Neurological:      General: No focal deficit present.      Mental Status: He is alert and oriented to person, place, and time.   Psychiatric:         Mood and Affect: Mood normal.          "Behavior: Behavior normal.         Thought Content: Thought content normal.         Judgment: Judgment normal.         Data Review:  All labs and radiology reviewed.    Assessment:  COPD exacerbation: Patient appears to have already had a significant response to IV steroids and bronchodilators.  Will continue with aggressive bronchodilators for the time being.  Reassess in the morning to see if he will need further steroids.  Hypotension: Uncertain etiology.  I was not able to get a reliable history on his past steroid use.  Check cosyntropin stimulation study in the morning.  Monitor closely.  Influenza A: Tamiflu.      Plan:  Please see above.  Discussed with patient and niece at bedside.  Discussed with ER provider.    Josue Matos MD  3/29/2025  15:49 EDT    EMR Dragon/Transcription disclaimer:   Much of this encounter note is an electronic transcription/translation of spoken language to printed text. The electronic translation of spoken language may permit erroneous, or at times, nonsensical words or phrases to be inadvertently transcribed; Although I have reviewed the note for such errors, some may still exist.     Addendum:  RN reports that patient got up to go to the bathroom and complained of lightheadedness and \"chest irritation\".  EKG and troponin pending.  Check orthostatic blood pressures.  Cardiology consultation.  Electronically signed by Josue Matos MD, 03/29/25, 4:59 PM EDT.      "

## 2025-03-29 NOTE — ED NOTES
Nursing report ED to floor  Pérez Hatfield  69 y.o.  male    HPI :  HPI  Stated Reason for Visit: cough and soa    Chief Complaint  Chief Complaint   Patient presents with    Shortness of Breath    Cough       Admitting doctor:   Josue Matos MD    Admitting diagnosis:   The primary encounter diagnosis was Influenza A. Diagnoses of Hypoxia and COPD exacerbation were also pertinent to this visit.    Code status:   Current Code Status       Date Active Code Status Order ID Comments User Context       Prior            Allergies:   Patient has no known allergies.    Isolation:   Enhanced Droplet/Contact , Droplet    Intake and Output  No intake or output data in the 24 hours ending 03/29/25 1320    Weight:       03/29/25  1034   Weight: 97.5 kg (215 lb)       Most recent vitals:   Vitals:    03/29/25 1115 03/29/25 1149 03/29/25 1253 03/29/25 1301   BP: 100/75  (!) 83/67 (!) 84/55   BP Location:       Patient Position: Lying  Lying    Pulse: 112 111 101 96   Resp: 24 20 16    Temp:       TempSrc:       SpO2: 92% 93% 92% 91%   Weight:       Height:           Active LDAs/IV Access:   Lines, Drains & Airways       Active LDAs       Name Placement date Placement time Site Days    Peripheral IV 03/29/25 1034 Anterior;Right Forearm 03/29/25  1034  Forearm  less than 1                    Labs (abnormal labs have a star):   Labs Reviewed   RESPIRATORY PANEL PCR W/ COVID-19 (SARS-COV-2), NP SWAB IN UTM/VTP, 2 HR TAT - Abnormal; Notable for the following components:       Result Value    Influenza A H1 2009 PCR Detected (*)     All other components within normal limits    Narrative:     In the setting of a positive respiratory panel with a viral infection PLUS a negative procalcitonin without other underlying concern for bacterial infection, consider observing off antibiotics or discontinuation of antibiotics and continue supportive care. If the respiratory panel is positive for atypical bacterial infection (Bordetella  pertussis, Chlamydophila pneumoniae, or Mycoplasma pneumoniae), consider antibiotic de-escalation to target atypical bacterial infection.   BASIC METABOLIC PANEL - Abnormal; Notable for the following components:    Glucose 106 (*)     All other components within normal limits    Narrative:     GFR Categories in Chronic Kidney Disease (CKD)      GFR Category          GFR (mL/min/1.73)    Interpretation  G1                     90 or greater         Normal or high (1)  G2                      60-89                Mild decrease (1)  G3a                   45-59                Mild to moderate decrease  G3b                   30-44                Moderate to severe decrease  G4                    15-29                Severe decrease  G5                    14 or less           Kidney failure          (1)In the absence of evidence of kidney disease, neither GFR category G1 or G2 fulfill the criteria for CKD.    eGFR calculation 2021 CKD-EPI creatinine equation, which does not include race as a factor   LACTIC ACID, PLASMA - Abnormal; Notable for the following components:    Lactate 2.2 (*)     All other components within normal limits   CBC WITH AUTO DIFFERENTIAL - Abnormal; Notable for the following components:    Neutrophil % 81.8 (*)     Lymphocyte % 7.1 (*)     Immature Grans % 0.6 (*)     Neutrophils, Absolute 8.57 (*)     Monocytes, Absolute 1.02 (*)     Immature Grans, Absolute 0.06 (*)     All other components within normal limits   PROCALCITONIN - Normal    Narrative:     As a Marker for Sepsis (Non-Neonates):    1. <0.5 ng/mL represents a low risk of severe sepsis and/or septic shock.  2. >2 ng/mL represents a high risk of severe sepsis and/or septic shock.    As a Marker for Lower Respiratory Tract Infections that require antibiotic therapy:    PCT on Admission    Antibiotic Therapy       6-12 Hrs later    >0.5                Strongly Recommended  >0.25 - <0.5        Recommended   0.1 - 0.25          Discouraged    "           Remeasure/reassess PCT  <0.1                Strongly Discouraged     Remeasure/reassess PCT    As 28 day mortality risk marker: \"Change in Procalcitonin Result\" (>80% or <=80%) if Day 0 (or Day 1) and Day 4 values are available. Refer to http://www.[facility]-pct-calculator.com    Change in PCT <=80%  A decrease of PCT levels below or equal to 80% defines a positive change in PCT test result representing a higher risk for 28-day all-cause mortality of patients diagnosed with severe sepsis for septic shock.    Change in PCT >80%  A decrease of PCT levels of more than 80% defines a negative change in PCT result representing a lower risk for 28-day all-cause mortality of patients diagnosed with severe sepsis or septic shock.      MAGNESIUM - Normal   RAINBOW DRAW    Narrative:     The following orders were created for panel order [place] Draw.  Procedure                               Abnormality         Status                     ---------                               -----------         ------                     Green Top (Gel)[[id number]]                                  Final result               Lavender Top[[id number]]                                     Final result               Gold Top - SST[[id number]]                                   Final result               Light Blue Top[[id number]]                                   Final result                 Please view results for these tests on the individual orders.   LACTIC ACID, REFLEX   CBC AND DIFFERENTIAL    Narrative:     The following orders were created for panel order CBC & Differential.  Procedure                               Abnormality         Status                     ---------                               -----------         ------                     CBC Auto Differential[[id number]]        Abnormal            Final result                 Please view results for these tests on the individual orders.   GREEN TOP   LAVENDER TOP   GOLD TOP - SST "   LIGHT BLUE TOP       EKG:   ECG 12 Lead Dyspnea   Preliminary Result   HEART IGQV=295  bpm   RR Stkalkgy=980  ms   ME Zlmvhicy=349  ms   P Horizontal Axis=-9  deg   P Front Axis=79  deg   QRSD Interval=85  ms   QT Jojnlrrk=120  ms   ITzL=847  ms   QRS Axis=69  deg   T Wave Axis=62  deg   - BORDERLINE ECG -   Sinus tachycardia   Biatrial enlargement   Low voltage, extremity leads   Date and Time of Study:2025 10:37:11          Meds given in ED:   Medications   sodium chloride 0.9 % bolus 1,000 mL (1,000 mL Intravenous New Bag 3/29/25 1302)   acetaminophen (TYLENOL) tablet 1,000 mg (1,000 mg Oral Given 3/29/25 1114)   methylPREDNISolone sodium succinate (SOLU-Medrol) injection 125 mg (125 mg Intravenous Given 3/29/25 1135)   ipratropium-albuterol (DUO-NEB) nebulizer solution 3 mL (3 mL Nebulization Given 3/29/25 1149)   oseltamivir (TAMIFLU) capsule 75 mg (75 mg Oral Given 3/29/25 1252)       Imaging results:  XR Chest 1 View  Result Date: 3/29/2025  Obstructive airways disease. No lung opacities.  This report was finalized on 3/29/2025 11:07 AM by Dr. Jacob Urrutia M.D on Workstation: [id number]        Ambulatory status:   - assist x1    Social issues:   Social History     Socioeconomic History    Marital status: Single   Tobacco Use    Smoking status: Former     Current packs/day: 0.00     Average packs/day: 1 pack/day for 10.0 years (10.0 ttl pk-yrs)     Types: Cigarettes     Start date: 1976     Quit date: 1986     Years since quittin.2    Smokeless tobacco: Never    Tobacco comments:     pt not sure of when he quit   Substance and Sexual Activity    Alcohol use: Yes     Comment: social    Drug use: No    Sexual activity: Not Currently       Peripheral Neurovascular  Peripheral Neurovascular (Adult)  Peripheral Neurovascular WDL: WDL    Neuro Cognitive  Neuro Cognitive (Adult)  Cognitive/Neuro/Behavioral WDL: WDL    Learning  Learning Assessment  Learning Readiness and Ability: no  barriers identified    Respiratory  Respiratory  Airway WDL: .WDL except  Respiratory WDL  Respiratory WDL: .WDL except, rhythm/pattern, cough  Rhythm/Pattern, Respiratory: unlabored, pattern regular  Cough Frequency: frequent  Cough Type: productive  Breath Sounds  All Lung Fields Breath Sounds: All Fields  All Lung Fields Breath Sounds: Anterior:, Lateral:, coarse, wheezes, expiratory, diminished  Breath Sounds Post-Respiratory Treatment  Breath Sounds Posttreatment All Fields: All Fields  Breath Sounds Posttreatment All Fields: no change    Abdominal Pain       Pain Assessments  Pain (Adult)  (0-10) Pain Rating: Rest: 6  (0-10) Pain Rating: Activity: 7  Pain Location: back  Pain Side/Orientation: lower  Response to Pain Interventions: nonverbal indicators absent/decreased    NIH Stroke Scale       Lubna Esquivel RN  03/29/25 13:20 EDT
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
## 2025-03-30 LAB
ALBUMIN SERPL-MCNC: 3.6 G/DL (ref 3.5–5.2)
ALP SERPL-CCNC: 84 U/L (ref 39–117)
ALT SERPL W P-5'-P-CCNC: 17 U/L (ref 1–41)
ANION GAP SERPL CALCULATED.3IONS-SCNC: 9.2 MMOL/L (ref 5–15)
AST SERPL-CCNC: 23 U/L (ref 1–40)
BILIRUB CONJ SERPL-MCNC: 0.2 MG/DL (ref 0–0.3)
BILIRUB INDIRECT SERPL-MCNC: 0.1 MG/DL
BILIRUB SERPL-MCNC: 0.3 MG/DL (ref 0–1.2)
BUN SERPL-MCNC: 16 MG/DL (ref 8–23)
BUN/CREAT SERPL: 18.2 (ref 7–25)
CALCIUM SPEC-SCNC: 8.4 MG/DL (ref 8.6–10.5)
CHLORIDE SERPL-SCNC: 104 MMOL/L (ref 98–107)
CO2 SERPL-SCNC: 27.8 MMOL/L (ref 22–29)
CORTIS SERPL-MCNC: 16.4 MCG/DL
CORTIS SERPL-MCNC: 2.01 MCG/DL
CORTIS SERPL-MCNC: 2.19 MCG/DL
CREAT SERPL-MCNC: 0.88 MG/DL (ref 0.76–1.27)
D-LACTATE SERPL-SCNC: 1.8 MMOL/L (ref 0.5–2)
D-LACTATE SERPL-SCNC: 2.1 MMOL/L (ref 0.5–2)
EGFRCR SERPLBLD CKD-EPI 2021: 93.1 ML/MIN/1.73
GLUCOSE SERPL-MCNC: 144 MG/DL (ref 65–99)
HBA1C MFR BLD: 5.3 % (ref 4.8–5.6)
POTASSIUM SERPL-SCNC: 4.4 MMOL/L (ref 3.5–5.2)
PROT SERPL-MCNC: 6.6 G/DL (ref 6–8.5)
QT INTERVAL: 346 MS
QT INTERVAL: 357 MS
QTC INTERVAL: 429 MS
QTC INTERVAL: 430 MS
SODIUM SERPL-SCNC: 141 MMOL/L (ref 136–145)
TSH SERPL DL<=0.05 MIU/L-ACNC: 0.49 UIU/ML (ref 0.27–4.2)

## 2025-03-30 PROCEDURE — 94761 N-INVAS EAR/PLS OXIMETRY MLT: CPT

## 2025-03-30 PROCEDURE — 80076 HEPATIC FUNCTION PANEL: CPT | Performed by: INTERNAL MEDICINE

## 2025-03-30 PROCEDURE — 94664 DEMO&/EVAL PT USE INHALER: CPT

## 2025-03-30 PROCEDURE — 82533 TOTAL CORTISOL: CPT | Performed by: HOSPITALIST

## 2025-03-30 PROCEDURE — 97530 THERAPEUTIC ACTIVITIES: CPT

## 2025-03-30 PROCEDURE — 99221 1ST HOSP IP/OBS SF/LOW 40: CPT | Performed by: INTERNAL MEDICINE

## 2025-03-30 PROCEDURE — 25010000002 COSYNTROPIN PER 0.25 MG: Performed by: HOSPITALIST

## 2025-03-30 PROCEDURE — 25810000003 SODIUM CHLORIDE 0.9 % SOLUTION: Performed by: INTERNAL MEDICINE

## 2025-03-30 PROCEDURE — 84443 ASSAY THYROID STIM HORMONE: CPT | Performed by: INTERNAL MEDICINE

## 2025-03-30 PROCEDURE — 83605 ASSAY OF LACTIC ACID: CPT | Performed by: EMERGENCY MEDICINE

## 2025-03-30 PROCEDURE — 94760 N-INVAS EAR/PLS OXIMETRY 1: CPT

## 2025-03-30 PROCEDURE — 83036 HEMOGLOBIN GLYCOSYLATED A1C: CPT | Performed by: INTERNAL MEDICINE

## 2025-03-30 PROCEDURE — 80048 BASIC METABOLIC PNL TOTAL CA: CPT | Performed by: HOSPITALIST

## 2025-03-30 PROCEDURE — 63710000001 PREDNISONE PER 1 MG: Performed by: INTERNAL MEDICINE

## 2025-03-30 PROCEDURE — 97162 PT EVAL MOD COMPLEX 30 MIN: CPT

## 2025-03-30 PROCEDURE — 94799 UNLISTED PULMONARY SVC/PX: CPT

## 2025-03-30 RX ORDER — PREDNISONE 20 MG/1
40 TABLET ORAL
Status: DISCONTINUED | OUTPATIENT
Start: 2025-03-30 | End: 2025-03-31 | Stop reason: HOSPADM

## 2025-03-30 RX ORDER — AZITHROMYCIN 250 MG/1
500 TABLET, FILM COATED ORAL
Status: DISCONTINUED | OUTPATIENT
Start: 2025-03-30 | End: 2025-03-31 | Stop reason: HOSPADM

## 2025-03-30 RX ORDER — DEXTROMETHORPHAN POLISTIREX 30 MG/5ML
60 SUSPENSION ORAL ONCE
Status: COMPLETED | OUTPATIENT
Start: 2025-03-30 | End: 2025-03-30

## 2025-03-30 RX ORDER — SODIUM CHLORIDE 9 MG/ML
100 INJECTION, SOLUTION INTRAVENOUS CONTINUOUS
Status: ACTIVE | OUTPATIENT
Start: 2025-03-30 | End: 2025-03-31

## 2025-03-30 RX ADMIN — IPRATROPIUM BROMIDE AND ALBUTEROL SULFATE 3 ML: .5; 3 SOLUTION RESPIRATORY (INHALATION) at 07:42

## 2025-03-30 RX ADMIN — BUDESONIDE AND FORMOTEROL FUMARATE DIHYDRATE 2 PUFF: 160; 4.5 AEROSOL RESPIRATORY (INHALATION) at 07:42

## 2025-03-30 RX ADMIN — BISACODYL 5 MG: 5 TABLET, COATED ORAL at 08:55

## 2025-03-30 RX ADMIN — ACETAMINOPHEN 650 MG: 160 SOLUTION ORAL at 21:02

## 2025-03-30 RX ADMIN — OSELTAMIVIR PHOSPHATE 75 MG: 75 CAPSULE ORAL at 08:55

## 2025-03-30 RX ADMIN — PREDNISONE 40 MG: 20 TABLET ORAL at 13:12

## 2025-03-30 RX ADMIN — PHENOL 1 SPRAY: 1.5 LIQUID ORAL at 22:00

## 2025-03-30 RX ADMIN — AZITHROMYCIN 500 MG: 250 TABLET, FILM COATED ORAL at 13:12

## 2025-03-30 RX ADMIN — ACETAMINOPHEN 650 MG: 325 TABLET, FILM COATED ORAL at 06:37

## 2025-03-30 RX ADMIN — DEXTROMETHORPHAN 60 MG: 30 SUSPENSION, EXTENDED RELEASE ORAL at 21:02

## 2025-03-30 RX ADMIN — PANTOPRAZOLE SODIUM 40 MG: 40 TABLET, DELAYED RELEASE ORAL at 08:55

## 2025-03-30 RX ADMIN — Medication 10 ML: at 21:09

## 2025-03-30 RX ADMIN — IPRATROPIUM BROMIDE AND ALBUTEROL SULFATE 3 ML: .5; 3 SOLUTION RESPIRATORY (INHALATION) at 18:46

## 2025-03-30 RX ADMIN — BUDESONIDE AND FORMOTEROL FUMARATE DIHYDRATE 2 PUFF: 160; 4.5 AEROSOL RESPIRATORY (INHALATION) at 18:46

## 2025-03-30 RX ADMIN — Medication 10 ML: at 08:55

## 2025-03-30 RX ADMIN — COSYNTROPIN 0.25 MG: 0.25 INJECTION, POWDER, LYOPHILIZED, FOR SOLUTION INTRAMUSCULAR; INTRAVENOUS at 06:37

## 2025-03-30 RX ADMIN — SODIUM CHLORIDE 100 ML/HR: 9 INJECTION, SOLUTION INTRAVENOUS at 13:12

## 2025-03-30 RX ADMIN — IPRATROPIUM BROMIDE AND ALBUTEROL SULFATE 3 ML: .5; 3 SOLUTION RESPIRATORY (INHALATION) at 14:23

## 2025-03-30 RX ADMIN — OSELTAMIVIR PHOSPHATE 75 MG: 75 CAPSULE ORAL at 21:02

## 2025-03-30 NOTE — PLAN OF CARE
Problem: Adult Inpatient Plan of Care  Goal: Plan of Care Review  Flowsheets (Taken 3/30/2025 0557)  Progress: no change  Outcome Evaluation: No changes. Medicated for pain per MAR. O2 in use @ 3L via NC. IVF's running per orders. Makes needs known. AOx4 but forgetful. Confrontational and verbally combative towards staff @ times. Stand-by assist. Call light within reach. Chair alarm set.  Plan of Care Reviewed With: patient  Goal: Absence of Hospital-Acquired Illness or Injury  Intervention: Identify and Manage Fall Risk  Flowsheets  Taken 3/30/2025 0435  Safety Promotion/Fall Prevention:   activity supervised   assistive device/personal items within reach   clutter free environment maintained   fall prevention program maintained   lighting adjusted   nonskid shoes/slippers when out of bed   room organization consistent   safety round/check completed  Taken 3/30/2025 0245  Safety Promotion/Fall Prevention:   activity supervised   assistive device/personal items within reach   clutter free environment maintained   fall prevention program maintained   lighting adjusted   nonskid shoes/slippers when out of bed   room organization consistent   safety round/check completed  Taken 3/30/2025 0058  Safety Promotion/Fall Prevention:   activity supervised   assistive device/personal items within reach   clutter free environment maintained   fall prevention program maintained   lighting adjusted   nonskid shoes/slippers when out of bed   room organization consistent   safety round/check completed  Taken 3/29/2025 2222  Safety Promotion/Fall Prevention:   activity supervised   assistive device/personal items within reach   clutter free environment maintained   fall prevention program maintained   lighting adjusted   nonskid shoes/slippers when out of bed   room organization consistent   safety round/check completed  Taken 3/29/2025 2002  Safety Promotion/Fall Prevention:   activity supervised   assistive device/personal items  within reach   clutter free environment maintained   fall prevention program maintained   lighting adjusted   nonskid shoes/slippers when out of bed   room organization consistent   safety round/check completed  Intervention: Prevent Skin Injury  Flowsheets  Taken 3/30/2025 0435  Body Position: position changed independently  Taken 3/30/2025 0245  Body Position: position changed independently  Taken 3/30/2025 0058  Body Position: position changed independently  Taken 3/29/2025 2222  Body Position: position changed independently  Taken 3/29/2025 2002  Body Position: position changed independently  Intervention: Prevent and Manage VTE (Venous Thromboembolism) Risk  Flowsheets (Taken 3/29/2025 1452 by Uzma Mujica, RN)  VTE Prevention/Management: patient refused intervention  Intervention: Prevent Infection  Flowsheets (Taken 3/29/2025 2100 by Jeannine Tse, PCT)  Infection Prevention:   environmental surveillance performed   hand hygiene promoted   rest/sleep promoted   single patient room provided  Goal: Optimal Comfort and Wellbeing  Intervention: Monitor Pain and Promote Comfort  Flowsheets (Taken 3/29/2025 2222)  Pain Management Interventions: pain medication given  Intervention: Provide Person-Centered Care  Flowsheets (Taken 3/29/2025 2002)  Trust Relationship/Rapport:   care explained   choices provided   emotional support provided   empathic listening provided   questions answered   questions encouraged   reassurance provided   thoughts/feelings acknowledged  Goal: Readiness for Transition of Care  Intervention: Mutually Develop Transition Plan  Flowsheets  Taken 3/29/2025 1506 by Uzma Mujica, RN  Transportation Anticipated: family or friend will provide  Patient/Family Anticipated Services at Transition: none  Patient/Family Anticipates Transition to: home  Taken 3/29/2025 1454 by Uzma Mujica, RN  Equipment Currently Used at Home: none     Problem: Comorbidity Management  Goal: Maintenance of COPD  Symptom Control  Intervention: Maintain COPD (Chronic Obstructive Pulmonary Disease) Symptom Control  Flowsheets  Taken 3/30/2025 0058  Medication Review/Management:   medications reviewed   high-risk medications identified  Taken 3/29/2025 2002  Medication Review/Management:   medications reviewed   high-risk medications identified  Goal: Blood Pressure in Desired Range  Intervention: Maintain Blood Pressure Management  Flowsheets  Taken 3/30/2025 0058  Medication Review/Management:   medications reviewed   high-risk medications identified  Taken 3/29/2025 2002  Medication Review/Management:   medications reviewed   high-risk medications identified     Problem: Fall Injury Risk  Goal: Absence of Fall and Fall-Related Injury  Intervention: Identify and Manage Contributors  Flowsheets  Taken 3/30/2025 0058  Medication Review/Management:   medications reviewed   high-risk medications identified  Taken 3/29/2025 2002  Medication Review/Management:   medications reviewed   high-risk medications identified  Intervention: Promote Injury-Free Environment  Flowsheets  Taken 3/30/2025 0435  Safety Promotion/Fall Prevention:   activity supervised   assistive device/personal items within reach   clutter free environment maintained   fall prevention program maintained   lighting adjusted   nonskid shoes/slippers when out of bed   room organization consistent   safety round/check completed  Taken 3/30/2025 0245  Safety Promotion/Fall Prevention:   activity supervised   assistive device/personal items within reach   clutter free environment maintained   fall prevention program maintained   lighting adjusted   nonskid shoes/slippers when out of bed   room organization consistent   safety round/check completed  Taken 3/30/2025 0058  Safety Promotion/Fall Prevention:   activity supervised   assistive device/personal items within reach   clutter free environment maintained   fall prevention program maintained   lighting adjusted    nonskid shoes/slippers when out of bed   room organization consistent   safety round/check completed  Taken 3/29/2025 2222  Safety Promotion/Fall Prevention:   activity supervised   assistive device/personal items within reach   clutter free environment maintained   fall prevention program maintained   lighting adjusted   nonskid shoes/slippers when out of bed   room organization consistent   safety round/check completed  Taken 3/29/2025 2002  Safety Promotion/Fall Prevention:   activity supervised   assistive device/personal items within reach   clutter free environment maintained   fall prevention program maintained   lighting adjusted   nonskid shoes/slippers when out of bed   room organization consistent   safety round/check completed   Goal Outcome Evaluation:  Plan of Care Reviewed With: patient        Progress: no change  Outcome Evaluation: No changes. Medicated for pain per MAR. O2 in use @ 3L via NC. IVF's running per orders. Makes needs known. AOx4 but forgetful. Confrontational and verbally combative towards staff @ times. Stand-by assist. Call light within reach. Chair alarm set.

## 2025-03-30 NOTE — CONSULTS
[facility]  Cardiology Consult Note    Patient Identification:  Name: Pérez Hatfield  Age: 69 y.o.  Sex: male  :  1955  MRN: [id number]             Requesting Physician: Dr. Matos    Reason for Consultation / Chief Complaint: management recommendations shortness of breath    History of Present Illness:   69-year-old male admitted with generalized weakness and malaise has been complaining of significant shortness of breath but no chest pain    Comorbid cardiac risk factors:     Past Medical History:  Past Medical History:   Diagnosis Date    Acute hypercapnic respiratory failure 2019    Chest pain 2019    COPD (chronic obstructive pulmonary disease)     Goiter     Herpes 2017    Meniscus tear     Multiple benign polyps of large intestine     Wrist fracture      Past Surgical History:  Past Surgical History:   Procedure Laterality Date    COLONOSCOPY      HERNIA REPAIR      KNEE ARTHROPLASTY      NECK SURGERY      POLYPECTOMY      TONSILLECTOMY      WRIST SURGERY        Allergies:  No Known Allergies  Home Meds:  Medications Prior to Admission   Medication Sig Dispense Refill Last Dose/Taking    ipratropium-albuterol (DUO-NEB) 0.5-2.5 mg/mL nebulizer 3 (three) times a day.       methylPREDNISolone (Medrol) 4 MG dose pack follow package directions 21 tablet 0     pantoprazole (Protonix) 40 MG EC tablet Take 1 tablet by mouth Daily. 14 tablet 0     predniSONE (DELTASONE) 10 MG tablet Take 1 tablet by mouth Daily.   Unknown    VENTOLIN  (90 BASE) MCG/ACT inhaler inhale 2 puffs every 4 hours if needed  1      Current Meds:   [unfilled]  Social History:   Social History     Tobacco Use    Smoking status: Former     Current packs/day: 0.00     Average packs/day: 1 pack/day for 10.0 years (10.0 ttl pk-yrs)     Types: Cigarettes     Start date: 1976     Quit date: 1986     Years since quittin.2    Smokeless tobacco: Never    Tobacco comments:     pt not sure of when  he quit   Substance Use Topics    Alcohol use: Yes     Comment: social      Family History:  Family History   Problem Relation Age of Onset    Alzheimer's disease Other     Thyroid disease Other     Hypertension Other         Review of Systems    Constitutional: No weakness,fatigue, fever, rigors, chills   Eyes: No vision changes, eye pain   ENT/oropharynx: No difficulty swallowing, sore throat, epistaxis, changes in hearing   Cardiovascular: No chest pain, chest tightness, palpitations, paroxysmal nocturnal dyspnea, orthopnea, diaphoresis, dizziness / syncopal episode   Respiratory: Moderate shortness of breath, dyspnea on exertion, cough, wheezing hemoptysis   Gastrointestinal: No abdominal pain, nausea, vomiting, diarrhea, bloody stools   Genitourinary: No hematuria, dysuria   Neurological: No headache, tremors, numbness,  one-sided weakness    Musculoskeletal: No cramps, myalgias,  joint pain, joint swelling   Integument: No rash, edema           Constitutional:  Temp:  [98.4 °F (36.9 °C)-99.1 °F (37.3 °C)] 98.4 °F (36.9 °C)  Heart Rate:  [] 90  Resp:  [16-24] 20  BP: ()/(55-77) 120/77    Physical Exam   General:  Appears in no acute distress  Eyes: PERTL,  HEENT:  No JVD. Thyroid not visibly enlarged. No mucosal pallor or cyanosis  Respiratory: Respirations regular and unlabored at rest. BBS with good air entry in all fields. No crackles, rubs or wheezes auscultated  Cardiovascular: S1S2 Regular rate and rhythm. No murmur, rub or gallop auscultated. No carotid bruits. DP/PT pulses    . No pretibial pitting edema  Gastrointestinal: Abdomen soft, flat, non tender. Bowel sounds present. No hepatosplenomegaly. No ascites  Musculoskeletal: SEE x4. No abnormal movements  Extremities: No digital clubbing or cyanosis  Skin: Color pink. Skin warm and dry to touch. No rashes  No xanthoma  Neuro: AAO x3 CN II-XII grossly intact              Cardiographics  ECG:     Telemetry:    Echocardiogram:      Imaging  Chest X-ray:     Lab Review   Results from last 7 days   Lab Units 03/29/25  1957 03/29/25  1723   HSTROP T ng/L 8 7     Results from last 7 days   Lab Units 03/29/25  1033   MAGNESIUM mg/dL 1.8     Results from last 7 days   Lab Units 03/30/25  0555   SODIUM mmol/L 141   POTASSIUM mmol/L 4.4   BUN mg/dL 16   CREATININE mg/dL 0.88   CALCIUM mg/dL 8.4*     @[id number]@  Results from last 7 days   Lab Units 03/29/25  1033   WBC 10*3/mm3 10.46   HEMOGLOBIN g/dL 15.9   HEMATOCRIT % 47.7   PLATELETS 10*3/mm3 175       Chest x-ray shows COPD      Assessment:  Shortness of breath  Generalized fever and malaise  Recommendations / Plan:   69-year-old male admitted with generalized fever and malaise complaining of shortness of breath  Blood pressure 1/20/1977  We will repeat EKGs and troponin  Troponin so far has been negative  Check the echocardiogram    Patient will need a stress test        Labs/tests ordered for am      Argelia Salinas MD  3/30/2025, 10:25 EDT      EMR Dragon/Transcription:   Dictated utilizing Dragon dictation

## 2025-03-30 NOTE — PLAN OF CARE
Goal Outcome Evaluation:  Plan of Care Reviewed With: patient        Progress: improving  Outcome Evaluation: Pérez Hatfield is a 69 y.o. male who presents to the ED via EMS from home c/o acute progressive shortness of breath and cough over the last 3 to 4 days. Patient with history of COPD. Work up reveals hypoxia. Prior to hospital admission, pt reports residing in home alone and 0 QIANA. Prior to hospital stay, pt was IADLs and utilized no AD at baseline. Pt UIC upon arrival, required SBA for STS transfers and CGA for ambulation with IV pole for 165 ft. Some unsteadiness and antalgic gait patterns noticed; however, pt reports limp is baseline and he does not feel he is unsteady. 2L O2 NC used with ambulation in hallway. Pt presents today with below baseline functional mobility. Pt will benefit from skilled PT to address the previous deficits and improve overall safety with functional mobility. Anticipate pt will D/C to home with assist vs [facility] pending progress, with pt living home alone and can be forgetful. He does not have family in [place] to come stay with him (per family and nsg) and he demonstrates some impulsivity. Will monitor closely.    Anticipated Discharge Disposition (PT): home with assist, assisted living
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
## 2025-03-30 NOTE — PROGRESS NOTES
Name: Pérez Hatfield ADMIT: 3/29/2025   : 1955  PCP: Tuan Champion MD    MRN: [id number] LOS: 1 days   AGE/SEX: 69 y.o. male  ROOM: [facility]     Subjective   Subjective   Patient reports that his shortness of breath is better.  Cough is dry and has improved.  Minimal wheezing.  No fever or chills.  No chest pain.  No hemoptysis.  No palpitation.  No ankle edema.    Review of Systems  GI.  No abdominal pain.  No nausea or vomiting.  .  No dysuria or hematuria  Constitutional.  No fever or chills.     Objective   Objective   Vital Signs  Temp:  [98.4 °F (36.9 °C)-99.1 °F (37.3 °C)] 98.4 °F (36.9 °C)  Heart Rate:  [] 90  Resp:  [16-22] 20  BP: ()/(55-77) 120/77  SpO2:  [88 %-96 %] 96 %  on  Flow (L/min) (Oxygen Therapy):  [2-4] 2;   Device (Oxygen Therapy): nasal cannula    Intake/Output Summary (Last 24 hours) at 3/30/2025 1139  Last data filed at 3/29/2025 1355  Gross per 24 hour   Intake 1000 ml   Output --   Net 1000 ml     Body mass index is 27.6 kg/m².      25  1034   Weight: 97.5 kg (215 lb)     Physical Exam  General.  Middle-aged to elderly gentleman.  Alert and oriented x 4.  In no apparent pain/distress/diaphoresis.  Normal mood and affect.  Eyes.  Pupils equal round and reactive.  Intact extraocular musculature.  No pallor or jaundice  Oral cavity.  Moist mucous membrane.  Neck.  Supple.  No JVD.  No lymphadenopathy no thyromegaly  Cardiovascular.  Regular rate and rhythm with grade 2 systolic murmur  Chest.  Poor bilateral air entry with scattered bilateral expiratory wheeze and scattered bilateral rhonchi  Abdomen.  Soft lax.  No tenderness.  No organomegaly.  No guarding or rebound  Extremities.  No clubbing/cyanosis/edema  CNS.  No acute focal neurological deficits    Results Review:      Results from last 7 days   Lab Units 25  0555 25  1033   SODIUM mmol/L 141 137   POTASSIUM mmol/L 4.4 4.2   CHLORIDE mmol/L 104 101   CO2 mmol/L 27.8 27.9   BUN mg/dL 16 12  "  CREATININE mg/dL 0.88 1.01   GLUCOSE mg/dL 144* 106*   CALCIUM mg/dL 8.4* 8.9     Estimated Creatinine Clearance: 109.3 mL/min (by C-G formula based on SCr of 0.88 mg/dL).          Results from last 7 days   Lab Units 03/29/25 1957 03/29/25  1723   HSTROP T ng/L 8 7             Results from last 7 days   Lab Units 03/29/25  1033   MAGNESIUM mg/dL 1.8           Invalid input(s): \"LDLCALC\"  Results from last 7 days   Lab Units 03/29/25  1033   WBC 10*3/mm3 10.46   HEMOGLOBIN g/dL 15.9   HEMATOCRIT % 47.7   PLATELETS 10*3/mm3 175   MCV fL 95.2   MCH pg 31.7   MCHC g/dL 33.3   RDW % 12.7   RDW-SD fl 44.2   MPV fL 9.9   NEUTROPHIL % % 81.8*   LYMPHOCYTE % % 7.1*   MONOCYTES % % 9.8   EOSINOPHIL % % 0.4   BASOPHIL % % 0.3   IMM GRAN % % 0.6*   NEUTROS ABS 10*3/mm3 8.57*   LYMPHS ABS 10*3/mm3 0.74   MONOS ABS 10*3/mm3 1.02*   EOS ABS 10*3/mm3 0.04   BASOS ABS 10*3/mm3 0.03   IMMATURE GRANS (ABS) 10*3/mm3 0.06*   NRBC /100 WBC 0.0             Results from last 7 days   Lab Units 03/30/25  0700 03/30/25  0142 03/29/25 1957 03/29/25  1640 03/29/25  1333 03/29/25  1033   PROCALCITONIN ng/mL  --   --   --   --   --  0.04   LACTATE mmol/L 1.8 2.1* 3.6* 2.5* 2.1* 2.2*                 Results from last 7 days   Lab Units 03/29/25  1033   ADENOVIRUS DETECTION BY PCR  Not Detected   CORONAVIRUS 229E  Not Detected   CORONAVIRUS HKU1  Not Detected   CORONAVIRUS NL63  Not Detected   CORONAVIRUS OC43  Not Detected   HUMAN METAPNEUMOVIRUS  Not Detected   HUMAN RHINOVIRUS/ENTEROVIRUS  Not Detected   INFLUENZA B PCR  Not Detected   PARAINFLUENZA 1  Not Detected   PARAINFLUENZA VIRUS 2  Not Detected   PARAINFLUENZA VIRUS 3  Not Detected   PARAINFLUENZA VIRUS 4  Not Detected   BORDETELLA PERTUSSIS PCR  Not Detected   CHLAMYDOPHILA PNEUMONIAE PCR  Not Detected   MYCOPLAMA PNEUMO PCR  Not Detected   RSV, PCR  Not Detected               Imaging:  Imaging Results (Last 24 Hours)       ** No results found for the last 24 hours. **             "   I reviewed the patient's new clinical results / labs / tests / procedures      Assessment/Plan     Active Hospital Problems    Diagnosis  POA    **Hypoxia [R09.02]  Yes      Resolved Hospital Problems   No resolved problems to display.           Acute hypoxemic respiratory failure secondary to COPD exacerbation (not oxygen or steroid-dependent) and influenza A lower respiratory infection.  Procalcitonin is normal.  Chest x-ray without any infiltrate.  Will continue Symbicort and DuoNebs and Tamiflu.  Will add p.o. prednisone.  Will add p.o. Zithromax.  Will wean off oxygen.  Will check walking pulse oximetry tomorrow.  Respiratory PCR panel is positive for influenza A.  Lactic acidosis.  Mostly related to hypotension.  There is no evidence of sepsis.  Resolved with IV fluid.  Hypotensive.  Mostly hypovolemia.  Resolved with IV fluid.  Will check TSH.  Cosyntropin test is negative.  Continue IV fluid.  C and L-spine disease and generalized osteoarthritis.  Continue Ultram.  Hyperglycemia.  Check A1c.  History of colon cancer.  No clinical evidence of recurrence.  History of mood disorder.  On no medications.  VTE prophylaxis.  Sequential compression device for      Discussed my findings and plan of treatment with the patient.  Disposition.  Anticipate discharge home tomorrow if continues to improve.        Eugenie Falcon MD  [facility]  03/30/25  11:39 EDT

## 2025-03-31 VITALS
SYSTOLIC BLOOD PRESSURE: 118 MMHG | RESPIRATION RATE: 18 BRPM | HEIGHT: 74 IN | OXYGEN SATURATION: 92 % | WEIGHT: 215 LBS | BODY MASS INDEX: 27.59 KG/M2 | HEART RATE: 93 BPM | DIASTOLIC BLOOD PRESSURE: 74 MMHG | TEMPERATURE: 98.6 F

## 2025-03-31 PROBLEM — J10.1 INFLUENZA A (H1N1): Status: ACTIVE | Noted: 2025-03-31

## 2025-03-31 PROBLEM — R73.9 HYPERGLYCEMIA: Status: ACTIVE | Noted: 2025-03-31

## 2025-03-31 PROBLEM — J44.0 CHRONIC OBSTRUCTIVE PULMONARY DISEASE WITH ACUTE LOWER RESPIRATORY INFECTION: Status: ACTIVE | Noted: 2025-03-31

## 2025-03-31 PROBLEM — E87.20 LACTIC ACIDOSIS: Status: ACTIVE | Noted: 2025-03-31

## 2025-03-31 PROBLEM — R06.02 SHORTNESS OF BREATH: Status: ACTIVE | Noted: 2025-03-31

## 2025-03-31 LAB
ANION GAP SERPL CALCULATED.3IONS-SCNC: 8.9 MMOL/L (ref 5–15)
BASOPHILS # BLD AUTO: 0.03 10*3/MM3 (ref 0–0.2)
BASOPHILS NFR BLD AUTO: 0.2 % (ref 0–1.5)
BUN SERPL-MCNC: 12 MG/DL (ref 8–23)
BUN/CREAT SERPL: 15.2 (ref 7–25)
CALCIUM SPEC-SCNC: 8.3 MG/DL (ref 8.6–10.5)
CHLORIDE SERPL-SCNC: 102 MMOL/L (ref 98–107)
CO2 SERPL-SCNC: 29.1 MMOL/L (ref 22–29)
CREAT SERPL-MCNC: 0.79 MG/DL (ref 0.76–1.27)
DEPRECATED RDW RBC AUTO: 44.1 FL (ref 37–54)
EGFRCR SERPLBLD CKD-EPI 2021: 96.2 ML/MIN/1.73
EOSINOPHIL # BLD AUTO: 0 10*3/MM3 (ref 0–0.4)
EOSINOPHIL NFR BLD AUTO: 0 % (ref 0.3–6.2)
ERYTHROCYTE [DISTWIDTH] IN BLOOD BY AUTOMATED COUNT: 12.6 % (ref 12.3–15.4)
GLUCOSE SERPL-MCNC: 114 MG/DL (ref 65–99)
HCT VFR BLD AUTO: 43.4 % (ref 37.5–51)
HGB BLD-MCNC: 14.4 G/DL (ref 13–17.7)
IMM GRANULOCYTES # BLD AUTO: 0.07 10*3/MM3 (ref 0–0.05)
IMM GRANULOCYTES NFR BLD AUTO: 0.4 % (ref 0–0.5)
LYMPHOCYTES # BLD AUTO: 1.13 10*3/MM3 (ref 0.7–3.1)
LYMPHOCYTES NFR BLD AUTO: 6.9 % (ref 19.6–45.3)
MCH RBC QN AUTO: 31.6 PG (ref 26.6–33)
MCHC RBC AUTO-ENTMCNC: 33.2 G/DL (ref 31.5–35.7)
MCV RBC AUTO: 95.2 FL (ref 79–97)
MONOCYTES # BLD AUTO: 1.51 10*3/MM3 (ref 0.1–0.9)
MONOCYTES NFR BLD AUTO: 9.2 % (ref 5–12)
NEUTROPHILS NFR BLD AUTO: 13.6 10*3/MM3 (ref 1.7–7)
NEUTROPHILS NFR BLD AUTO: 83.3 % (ref 42.7–76)
NRBC BLD AUTO-RTO: 0 /100 WBC (ref 0–0.2)
PLATELET # BLD AUTO: 154 10*3/MM3 (ref 140–450)
PMV BLD AUTO: 10.4 FL (ref 6–12)
POTASSIUM SERPL-SCNC: 4.3 MMOL/L (ref 3.5–5.2)
RBC # BLD AUTO: 4.56 10*6/MM3 (ref 4.14–5.8)
SODIUM SERPL-SCNC: 140 MMOL/L (ref 136–145)
WBC NRBC COR # BLD AUTO: 16.34 10*3/MM3 (ref 3.4–10.8)

## 2025-03-31 PROCEDURE — 97165 OT EVAL LOW COMPLEX 30 MIN: CPT

## 2025-03-31 PROCEDURE — 94761 N-INVAS EAR/PLS OXIMETRY MLT: CPT

## 2025-03-31 PROCEDURE — 80048 BASIC METABOLIC PNL TOTAL CA: CPT | Performed by: INTERNAL MEDICINE

## 2025-03-31 PROCEDURE — 85025 COMPLETE CBC W/AUTO DIFF WBC: CPT | Performed by: INTERNAL MEDICINE

## 2025-03-31 PROCEDURE — 94799 UNLISTED PULMONARY SVC/PX: CPT

## 2025-03-31 PROCEDURE — 99232 SBSQ HOSP IP/OBS MODERATE 35: CPT

## 2025-03-31 PROCEDURE — 63710000001 PREDNISONE PER 1 MG: Performed by: INTERNAL MEDICINE

## 2025-03-31 RX ORDER — AZITHROMYCIN 500 MG/1
500 TABLET, FILM COATED ORAL
Qty: 2 TABLET | Refills: 0 | Status: SHIPPED | OUTPATIENT
Start: 2025-03-31 | End: 2025-04-02

## 2025-03-31 RX ORDER — BUDESONIDE AND FORMOTEROL FUMARATE DIHYDRATE 160; 4.5 UG/1; UG/1
2 AEROSOL RESPIRATORY (INHALATION)
Qty: 1 EACH | Refills: 0 | COMMUNITY
Start: 2025-03-31

## 2025-03-31 RX ORDER — PREDNISONE 20 MG/1
40 TABLET ORAL
Qty: 8 TABLET | Refills: 0 | Status: SHIPPED | OUTPATIENT
Start: 2025-03-31 | End: 2025-04-04

## 2025-03-31 RX ORDER — OSELTAMIVIR PHOSPHATE 75 MG/1
75 CAPSULE ORAL EVERY 12 HOURS SCHEDULED
Qty: 7 CAPSULE | Refills: 0 | Status: SHIPPED | OUTPATIENT
Start: 2025-03-31 | End: 2025-04-04

## 2025-03-31 RX ADMIN — OSELTAMIVIR PHOSPHATE 75 MG: 75 CAPSULE ORAL at 10:48

## 2025-03-31 RX ADMIN — Medication 10 ML: at 10:50

## 2025-03-31 RX ADMIN — BUDESONIDE AND FORMOTEROL FUMARATE DIHYDRATE 2 PUFF: 160; 4.5 AEROSOL RESPIRATORY (INHALATION) at 08:18

## 2025-03-31 RX ADMIN — PANTOPRAZOLE SODIUM 40 MG: 40 TABLET, DELAYED RELEASE ORAL at 10:49

## 2025-03-31 RX ADMIN — AZITHROMYCIN 500 MG: 250 TABLET, FILM COATED ORAL at 10:49

## 2025-03-31 RX ADMIN — IPRATROPIUM BROMIDE AND ALBUTEROL SULFATE 3 ML: .5; 3 SOLUTION RESPIRATORY (INHALATION) at 08:17

## 2025-03-31 RX ADMIN — PREDNISONE 40 MG: 20 TABLET ORAL at 10:49

## 2025-03-31 RX ADMIN — IPRATROPIUM BROMIDE AND ALBUTEROL SULFATE 3 ML: .5; 3 SOLUTION RESPIRATORY (INHALATION) at 14:20

## 2025-03-31 NOTE — PLAN OF CARE
Goal Outcome Evaluation:           Progress: no change  Outcome Evaluation: Pt is a 69 year old male adm with hypoxia related to COPD exacerbation and flu A. PMHx significant for chronic neck/back pain, BLE edema, COPD, GERD. PTA, pt IND with ADLs and lives at home x self, able to drive and manages medications, grocery shopping. This date, pt able to stand, complete out of room mobility and transfers with SBA. Pt received on RA with O2 sats 91% following standing tasks with no c/o SOA. Pt felt to progress appropriately to safely return home once medically stable. OT to s/o with no further IPOT needs indicated at this time.    Anticipated Discharge Disposition (OT): home

## 2025-03-31 NOTE — PLAN OF CARE
Problem: Adult Inpatient Plan of Care  Goal: Plan of Care Review  Flowsheets (Taken 3/31/2025 3288)  Outcome Evaluation: AOX4. Room air. Ad roseanne. Meds to beds delivered. IV removed. Discharge instructions discussed at bedside with patient and family. Patient given symbicort inhaler at bedside. Belongings gathered. Patient discharging home with family.   Goal Outcome Evaluation:              Outcome Evaluation: AOX4. Room air. Ad roseanne. Meds to beds delivered. IV removed. Discharge instructions discussed at bedside with patient and family. Patient given symbicort inhaler at bedside. Belongings gathered. Patient discharging home with family.

## 2025-03-31 NOTE — PAYOR COMM NOTE
"Pérez Meade (69 y.o. Male)     ATTN: NURSE REVIEWER  RE: INITIAL INPT AUTH CLINICALS   REF: [id number]  PLS REPLY TO THOAMS JACOBSEN [phone] OR FAX# [phone]      Date of Birth   1955    Social Security Number       Address   [address] [place], [place] [address]    Home Phone       MRN   [id number]       Anglican   None    Marital Status   Single                            Admission Date   3/29/2025    Admission Type   Emergency    Admitting Provider   Josue Matos MD    Attending Provider   Eugenie Falcon MD    Department, Room/Bed   [address], [address]/1       Discharge Date       Discharge Disposition       Discharge Destination                                 Attending Provider: Eugenie Falcon MD    Allergies: No Known Allergies    Isolation: Droplet   Infection: Influenza (25)   Code Status: CPR    Ht: 188 cm (74\")   Wt: 97.5 kg (215 lb)    Admission Cmt: None   Principal Problem: Hypoxia [R09.02]                   Active Insurance as of 3/29/2025       Primary Coverage       Payor Plan Insurance Group Employer/Plan Group    HUMANA MEDICARE REPLACEMENT [employer] MEDICARE ADVANTAGE GROUP [id number]       Payor Plan Address Payor Plan Phone Number Payor Plan Fax Number Effective Dates    [address] [address] [phone]  2018 - None Entered    [facility] [address]         Subscriber Name Subscriber Birth Date Member ID       PÉREZ MEADE 1955 [id number]                     Emergency Contacts        (Rel.) Home Phone Work Phone Mobile Phone    KEVINDASHA (NIECE) (Relative) -- -- [phone]    Nelia Birch (Sister) [phone] -- --                 History & Physical        Josue Matso MD at 25 [phone]          HISTORY AND PHYSICAL   [facility]        Patient Identification:  Name: Pérez Meade  Age: 69 y.o.  Sex: male  :  1955  MRN: [id number]                     Primary Care Physician: " Tuan Champion MD    Chief Complaint: Short of breath    History of Present Illness:   69-year-old gentleman with history of COPD presents complaining of increasing shortness of breath.  History comes from the patient himself and the niece at bedside.  He states his problem started about 2 days ago but the niece states he has been complaining for at least 4 days.  It started with generalized myalgias, fevers, headache and increased nonproductive cough.  Last 3 to 4 days he has been noticing increasing shortness of air and presented to the emergency room for further evaluation and treatment.  Compliance with medications have's been questionable.  His niece states that he is not on home O2 but plans were actually to get him set up for this.  She notes that he was prescribed steroids in February but is uncertain as to whether he is ever taken them or started taking them and then quit.  Patient was treated with IV steroids emergency room as well as bronchodilator treatments and presently is feeling much better.  Also noted to be hypotensive in the emergency room.  Patient denies lightheaded spells.  He does not get flu vaccines.      Past Medical History:  Past Medical History:   Diagnosis Date    Acute hypercapnic respiratory failure 11/26/2019    Chest pain 2/16/2019    COPD (chronic obstructive pulmonary disease)     Goiter     Herpes 1/23/2017    Meniscus tear     Multiple benign polyps of large intestine     Wrist fracture      Past Surgical History:  Past Surgical History:   Procedure Laterality Date    COLONOSCOPY      HERNIA REPAIR      KNEE ARTHROPLASTY      NECK SURGERY      POLYPECTOMY      TONSILLECTOMY      WRIST SURGERY        Home Meds:  Medications Prior to Admission   Medication Sig Dispense Refill Last Dose/Taking    ipratropium-albuterol (DUO-NEB) 0.5-2.5 mg/mL nebulizer 3 (three) times a day.       methylPREDNISolone (Medrol) 4 MG dose pack follow package directions 21 tablet 0     pantoprazole  (Protonix) 40 MG EC tablet Take 1 tablet by mouth Daily. 14 tablet 0     predniSONE (DELTASONE) 10 MG tablet Take 1 tablet by mouth Daily.   Unknown    VENTOLIN  (90 BASE) MCG/ACT inhaler inhale 2 puffs every 4 hours if needed  1        Allergies:  No Known Allergies  Immunizations:  Immunization History   Administered Date(s) Administered    Flu Vaccine Intradermal Quad 18-64YR 10/28/2019    Flu Vaccine Quad PF >36MO 2017, 2018    Fluad Quad 65+ 2020    Fluzone (or Fluarix & Flulaval for VFC) >6mos 2017, 2018    Fluzone High-Dose 65+yrs 2021    Fluzone Quad >6mos (Multi-dose) 2017    Pneumococcal Polysaccharide (PPSV23) 2020    flucelvax quad pfs =>4 YRS 10/28/2019     Social History:   Social History     Social History Narrative    Not on file     Social History     Tobacco Use    Smoking status: Former     Current packs/day: 0.00     Average packs/day: 1 pack/day for 10.0 years (10.0 ttl pk-yrs)     Types: Cigarettes     Start date: 1976     Quit date: 1986     Years since quittin.2    Smokeless tobacco: Never    Tobacco comments:     pt not sure of when he quit   Substance Use Topics    Alcohol use: Yes     Comment: social     Family History:  Family History   Problem Relation Age of Onset    Alzheimer's disease Other     Thyroid disease Other     Hypertension Other         Review of Systems  Review of Systems   Constitutional:         As per history of present illness.   HENT: Negative.     Eyes: Negative.    Respiratory:          As per history of present illness.   Cardiovascular: Negative.    Gastrointestinal: Negative.    Endocrine: Negative.    Genitourinary: Negative.    Musculoskeletal:         As per history of present illness.   Skin: Negative.    Allergic/Immunologic: Negative.    Neurological: Negative.    Hematological: Negative.    Psychiatric/Behavioral: Negative.         Objective:  T Max 24 hrs: Temp (24hrs), Av.4 °F (37.4  °C), Min:98.8 °F (37.1 °C), Max:100.2 °F (37.9 °C)    Vitals Ranges:   Temp:  [98.8 °F (37.1 °C)-100.2 °F (37.9 °C)] 98.8 °F (37.1 °C)  Heart Rate:  [] 89  Resp:  [16-24] 22  BP: ()/(55-75) 101/61      Exam:  Physical Exam  Constitutional:       General: He is not in acute distress.     Appearance: Normal appearance. He is normal weight. He is not ill-appearing, toxic-appearing or diaphoretic.   HENT:      Head: Normocephalic and atraumatic.      Right Ear: External ear normal.      Left Ear: External ear normal.      Nose: Nose normal.      Mouth/Throat:      Mouth: Mucous membranes are moist.   Eyes:      Extraocular Movements: Extraocular movements intact.      Conjunctiva/sclera: Conjunctivae normal.   Cardiovascular:      Rate and Rhythm: Normal rate and regular rhythm.      Heart sounds:      No friction rub. No gallop.   Pulmonary:      Effort: Pulmonary effort is normal.      Breath sounds: Normal breath sounds.      Comments: Surprisingly benign pulmonary exam at present.  Lungs are clear with good air movement.  He has been on room air for at least 10 minutes, has gone to the bathroom and back and his O2 saturation was 91%.  Abdominal:      General: Abdomen is flat. Bowel sounds are normal. There is no distension.      Palpations: Abdomen is soft. There is no mass.      Tenderness: There is no abdominal tenderness. There is no guarding or rebound.   Musculoskeletal:      Cervical back: Neck supple.      Right lower leg: No edema.      Left lower leg: No edema.   Skin:     General: Skin is warm and dry.   Neurological:      General: No focal deficit present.      Mental Status: He is alert and oriented to person, place, and time.   Psychiatric:         Mood and Affect: Mood normal.         Behavior: Behavior normal.         Thought Content: Thought content normal.         Judgment: Judgment normal.         Data Review:  All labs and radiology reviewed.    Assessment:  COPD exacerbation: Patient  "appears to have already had a significant response to IV steroids and bronchodilators.  Will continue with aggressive bronchodilators for the time being.  Reassess in the morning to see if he will need further steroids.  Hypotension: Uncertain etiology.  I was not able to get a reliable history on his past steroid use.  Check cosyntropin stimulation study in the morning.  Monitor closely.  Influenza A: Tamiflu.      Plan:  Please see above.  Discussed with patient and niece at bedside.  Discussed with ER provider.    Josue Matos MD  3/29/2025  15:49 EDT    EMR Dragon/Transcription disclaimer:   Much of this encounter note is an electronic transcription/translation of spoken language to printed text. The electronic translation of spoken language may permit erroneous, or at times, nonsensical words or phrases to be inadvertently transcribed; Although I have reviewed the note for such errors, some may still exist.     Addendum:  RN reports that patient got up to go to the bathroom and complained of lightheadedness and \"chest irritation\".  EKG and troponin pending.  Check orthostatic blood pressures.  Cardiology consultation.  Electronically signed by Josue Matos MD, 03/29/25, 4:59 PM EDT.        Electronically signed by Josue Matos MD at 03/29/25 1659       Facility-Administered Medications as of 3/30/2025   Medication Dose Route Frequency Provider Last Rate Last Admin    acetaminophen (TYLENOL) tablet 650 mg  650 mg Oral Q4H PRN Josue Matos MD   650 mg at 03/30/25 0637    Or    acetaminophen (TYLENOL) 160 MG/5ML oral solution 650 mg  650 mg Oral Q4H PRN Josue Matos MD   650 mg at 03/30/25 2102    Or    acetaminophen (TYLENOL) suppository 650 mg  650 mg Rectal Q4H PRN Josue Matos MD        [COMPLETED] acetaminophen (TYLENOL) tablet 1,000 mg  1,000 mg Oral Once Tanmay Perry MD   1,000 mg at 03/29/25 1114    albuterol (ACCUNEB) nebulizer solution 0.63 mg  0.63 mg " Nebulization Q6H PRN Josue Matos MD        azithromycin (ZITHROMAX) tablet 500 mg  500 mg Oral Q24H Eugenie Falcon MD   500 mg at 03/30/25 1312    sennosides-docusate (PERICOLACE) 8.6-50 MG per tablet 2 tablet  2 tablet Oral BID PRN Josue Matos MD   2 tablet at 03/29/25 2034    And    polyethylene glycol (MIRALAX) packet 17 g  17 g Oral Daily PRN Josue Matos MD        And    bisacodyl (DULCOLAX) EC tablet 5 mg  5 mg Oral Daily PRN Josue Matos MD   5 mg at 03/30/25 0855    And    bisacodyl (DULCOLAX) suppository 10 mg  10 mg Rectal Daily PRN Josue Matos MD        budesonide-formoterol (SYMBICORT) 160-4.5 MCG/ACT inhaler 2 puff  2 puff Inhalation BID - RT Josue Matos MD   2 puff at 03/30/25 1846    [COMPLETED] cosyntropin (CORTROSYN) injection 0.25 mg  0.25 mg Intravenous Once Josue Matos MD   0.25 mg at 03/30/25 0637    [COMPLETED] dextromethorphan polistirex ER (DELSYM) 30 MG/5ML oral suspension 60 mg  60 mg Oral Once Laine Alfonso APRN   60 mg at 03/30/25 2102    [COMPLETED] ipratropium-albuterol (DUO-NEB) nebulizer solution 3 mL  3 mL Nebulization Once Tanmay Perry MD   3 mL at 03/29/25 1149    ipratropium-albuterol (DUO-NEB) nebulizer solution 3 mL  3 mL Nebulization Q6H While Awake - RT Josue Matos MD   3 mL at 03/30/25 1846    [COMPLETED] methylPREDNISolone sodium succinate (SOLU-Medrol) injection 125 mg  125 mg Intravenous Once Tanmay Perry MD   125 mg at 03/29/25 1135    nitroglycerin (NITROSTAT) SL tablet 0.4 mg  0.4 mg Sublingual Q5 Min PRN Josue Matos MD        ondansetron ODT (ZOFRAN-ODT) disintegrating tablet 4 mg  4 mg Oral Q6H PRN Josue Matos MD        Or    ondansetron (ZOFRAN) injection 4 mg  4 mg Intravenous Q6H PRN Josue Matos MD        [COMPLETED] oseltamivir (TAMIFLU) capsule 75 mg  75 mg Oral Once Tanmay Perry MD   75 mg at 03/29/25 1252    oseltamivir (TAMIFLU) capsule 75 mg  75  mg Oral Q12H Josue Matos MD   75 mg at 25 210    pantoprazole (PROTONIX) EC tablet 40 mg  40 mg Oral Daily Josue Matos MD   40 mg at 25 0855    phenol (CHLORASEPTIC) 1.4 % liquid 1 spray  1 spray Mouth/Throat Q2H PRN Laine Alfonso APRN        predniSONE (DELTASONE) tablet 40 mg  40 mg Oral Daily With Breakfast Eugenie Falcon MD   40 mg at 25 1312    [COMPLETED] sodium chloride 0.9 % bolus 1,000 mL  1,000 mL Intravenous Once Tanmay Perry MD   Stopped at 25 1355    [COMPLETED] sodium chloride 0.9 % bolus 1,000 mL  1,000 mL Intravenous Once Tanmay Perry MD 2,000 mL/hr at 25 1401 1,000 mL at 25 1401    sodium chloride 0.9 % flush 10 mL  10 mL Intravenous Q12H Josue Matos MD   10 mL at 25 2109    sodium chloride 0.9 % flush 10 mL  10 mL Intravenous PRN Josue Matos MD        sodium chloride 0.9 % infusion 40 mL  40 mL Intravenous PRN Josue Matos MD        [] sodium chloride 0.9 % infusion  100 mL/hr Intravenous Continuous Jovita Pandya APRN 100 mL/hr at 25 2230 100 mL/hr at 25 2230    sodium chloride 0.9 % infusion  100 mL/hr Intravenous Continuous Eugenie Falcon  mL/hr at 25 1312 100 mL/hr at 25 1312    [COMPLETED] traMADol (ULTRAM) tablet 25 mg  25 mg Oral Once Jovita Pandya APRN   25 mg at 25 2325     Lab Results (last 24 hours)       Procedure Component Value Units Date/Time    TSH [[id number]]  (Normal) Collected: 25 1238    Specimen: Blood from Arm, Left Updated: 25 1332     TSH 0.491 uIU/mL     Hepatic Function Panel [[id number]] Collected: 25 1238    Specimen: Blood from Arm, Left Updated: 25 1326     Total Protein 6.6 g/dL      Albumin 3.6 g/dL      ALT (SGPT) 17 U/L      AST (SGOT) 23 U/L      Alkaline Phosphatase 84 U/L      Total Bilirubin 0.3 mg/dL      Bilirubin, Direct 0.2 mg/dL      Bilirubin, Indirect 0.1 mg/dL      Hemoglobin A1c [[id number]]  (Normal) Collected: 03/30/25 1238    Specimen: Blood from Arm, Left Updated: 03/30/25 1314     Hemoglobin A1C 5.30 %     Narrative:      Hemoglobin A1C Ranges:    Increased Risk for Diabetes  5.7% to 6.4%  Diabetes                     >= 6.5%  Diabetic Goal                < 7.0%    Cortisol [[id number]] Collected: 03/30/25 0700    Specimen: Blood from Hand, Right Updated: 03/30/25 1058     Cortisol 16.40 mcg/dL     Narrative:      Cortisol Reference Ranges:    Cortisol 6AM - 10AM Range: 6.02-18.40 mcg/dl  Cortisol 4PM - 8PM Range: 2.68-10.50 mcg/dl      Results may be falsely increased if patient taking Biotin.      Cortisol [[id number]] Collected: 03/30/25 0626    Specimen: Blood Updated: 03/30/25 1058     Cortisol 2.01 mcg/dL     Narrative:      Cortisol Reference Ranges:    Cortisol 6AM - 10AM Range: 6.02-18.40 mcg/dl  Cortisol 4PM - 8PM Range: 2.68-10.50 mcg/dl      Results may be falsely increased if patient taking Biotin.      Cortisol [[id number]] Collected: 03/30/25 0555    Specimen: Blood Updated: 03/30/25 1057     Cortisol 2.19 mcg/dL     Narrative:      Cortisol Reference Ranges:    Cortisol 6AM - 10AM Range: 6.02-18.40 mcg/dl  Cortisol 4PM - 8PM Range: 2.68-10.50 mcg/dl      Results may be falsely increased if patient taking Biotin.      STAT Lactic Acid, Reflex [[id number]]  (Normal) Collected: 03/30/25 0700    Specimen: Blood from Hand, Right Updated: 03/30/25 0730     Lactate 1.8 mmol/L     Basic Metabolic Panel [[id number]]  (Abnormal) Collected: 03/30/25 0555    Specimen: Blood Updated: 03/30/25 0711     Glucose 144 mg/dL      BUN 16 mg/dL      Creatinine 0.88 mg/dL      Sodium 141 mmol/L      Potassium 4.4 mmol/L      Chloride 104 mmol/L      CO2 27.8 mmol/L      Calcium 8.4 mg/dL      BUN/Creatinine Ratio 18.2     Anion Gap 9.2 mmol/L      eGFR 93.1 mL/min/1.73     Narrative:      GFR Categories in Chronic Kidney Disease (CKD)      GFR Category          GFR (mL/min/1.73)     Interpretation  G1                     90 or greater         Normal or high (1)  G2                      60-89                Mild decrease (1)  G3a                   45-59                Mild to moderate decrease  G3b                   30-44                Moderate to severe decrease  G4                    15-29                Severe decrease  G5                    14 or less           Kidney failure          (1)In the absence of evidence of kidney disease, neither GFR category G1 or G2 fulfill the criteria for CKD.    eGFR calculation 2021 CKD-EPI creatinine equation, which does not include race as a factor    STAT Lactic Acid, Reflex [[id number]]  (Abnormal) Collected: 03/30/25 0142    Specimen: Blood Updated: 03/30/25 0214     Lactate 2.1 mmol/L           Imaging Results (Last 24 Hours)       ** No results found for the last 24 hours. **          ECG/EMG Results (last 24 hours)       Procedure Component Value Units Date/Time    Telemetry Scan [[id number]] Resulted: 03/29/25     Updated: 03/30/25 0637    ECG 12 Lead Chest Pain [[id number]] Collected: 03/29/25 1654     Updated: 03/30/25 1542     QT Interval 357 ms      QTC Interval 429 ms     Narrative:      HEART RATE=87  bpm  RR Axeryoqc=446  ms  GA Interval=160  ms  P Horizontal Axis=-3  deg  P Front Axis=Invalid  deg  QRSD Interval=94  ms  QT Lnfxsqxm=626  ms  PKqQ=905  ms  QRS Axis=51  deg  T Wave Axis=35  deg  - OTHERWISE NORMAL ECG -  Sinus rhythm  Minimal ST elevation, anterior leads  No change from prior tracing  Electronically Signed By: Kenji Bettencourt ([facility]) 2025-03-30 15:41:40  Date and Time of Study:2025-03-29 16:54:22    ECG 12 Lead Tachycardia [[id number]] Collected: 03/29/25 2314     Updated: 03/30/25 1550     QT Interval 346 ms      QTC Interval 430 ms     Narrative:      HEART RATE=93  bpm  RR Waijvdzc=753  ms  GA Interval=160  ms  P Horizontal Axis=-6  deg  P Front Axis=77  deg  QRSD Interval=85  ms  QT Elqkorvi=442  ms  ENyE=772  ms  QRS Axis=70   deg  T Wave Axis=74  deg  - ABNORMAL ECG -  Sinus rhythm  Right atrial enlargement  Low voltage, precordial leads  No change from prior tracing  Electronically Signed By: Kenji Bettencourt ([facility]) 2025-03-30 15:48:17  Date and Time of Study:2025-03-29 23:14:33          Orders (last 24 hrs)        Start     Ordered    03/31/25 1000  Walking Oximetry (6 Minute Walk)  Once         03/30/25 1247    03/31/25 0600  CBC & Differential  Daily       03/30/25 1130    03/31/25 0600  Basic Metabolic Panel  Daily       03/30/25 1130    03/31/25 0600  CBC Auto Differential  PROCEDURE ONCE         03/30/25 2202    03/31/25 0001  NPO Diet NPO Type: Strict NPO  Diet Effective Midnight         03/30/25 1546    03/30/25 2145  dextromethorphan polistirex ER (DELSYM) 30 MG/5ML oral suspension 60 mg  Once         03/30/25 2052 03/30/25 2051  phenol (CHLORASEPTIC) 1.4 % liquid 1 spray  Every 2 Hours PRN         03/30/25 2052 03/30/25 1230  predniSONE (DELTASONE) tablet 40 mg  Daily With Breakfast         03/30/25 1139    03/30/25 1230  azithromycin (ZITHROMAX) tablet 500 mg  Every 24 Hours Scheduled         03/30/25 1139    03/30/25 1230  sodium chloride 0.9 % infusion  Continuous         03/30/25 1139    03/30/25 1151  Diet: Regular/House; Fluid Consistency: Thin (IDDSI 0)  Diet Effective Now         03/30/25 1150    03/30/25 1145  TSH  Once         03/30/25 1144    03/30/25 1139  Walking Oximetry  Once         03/30/25 1139    03/30/25 1138  Please wean off oxygen to keep O2 sats more than 90%  Nursing Communication  Once        Comments: Please wean off oxygen to keep O2 sats more than 90%    03/30/25 1139    03/30/25 1131  Hepatic Function Panel  Once         03/30/25 1130    03/30/25 1131  Hemoglobin A1c  Once         03/30/25 1130    03/30/25 0742  STAT Lactic Acid, Reflex  PROCEDURE ONCE         03/30/25 0214    03/30/25 0600  Cortisol  Every 30 Minutes      Comments: Draw Baseline Cortisol Level Prior to Injection.Then Draw  "Cortisol Level at 30 and 60 Minutes After Injection     Placed in \"And\" Linked Group    03/29/25 1608    03/30/25 0600  cosyntropin (CORTROSYN) injection 0.25 mg  Once        Placed in \"And\" Linked Group    03/29/25 1608    03/30/25 0600  Basic Metabolic Panel  Morning Draw         03/29/25 1610    03/30/25 0600  Lactic Acid, Plasma  Morning Draw,   Status:  Canceled         03/29/25 2102 03/30/25 0518  NPO Diet NPO Type: Sips with Meds  Diet Effective Now,   Status:  Canceled         03/30/25 0517    03/30/25 0157  STAT Lactic Acid, Reflex  PROCEDURE ONCE         03/29/25 2040    03/30/25 0000  traMADol (ULTRAM) tablet 25 mg  Once         03/29/25 2312    03/29/25 2200  sodium chloride 0.9 % infusion  Continuous         03/29/25 2102 03/29/25 2130  budesonide-formoterol (SYMBICORT) 160-4.5 MCG/ACT inhaler 2 puff  2 Times Daily - RT         03/29/25 1610    03/29/25 2100  oseltamivir (TAMIFLU) capsule 75 mg  Every 12 Hours Scheduled         03/29/25 1530    03/29/25 2100  sodium chloride 0.9 % flush 10 mL  Every 12 Hours Scheduled         03/29/25 1610    03/29/25 2000  Vital Signs  Every 4 Hours       03/29/25 1610 03/29/25 1900  ipratropium-albuterol (DUO-NEB) nebulizer solution 3 mL  Every 6 Hours While Awake - RT         03/29/25 1610    03/29/25 1800  Oral Care  2 Times Daily       03/29/25 1610    03/29/25 1700  pantoprazole (PROTONIX) EC tablet 40 mg  Daily         03/29/25 1610    03/29/25 1606  ondansetron ODT (ZOFRAN-ODT) disintegrating tablet 4 mg  Every 6 Hours PRN        Placed in \"Or\" Linked Group    03/29/25 1610    03/29/25 1606  ondansetron (ZOFRAN) injection 4 mg  Every 6 Hours PRN        Placed in \"Or\" Linked Group    03/29/25 1610    03/29/25 1606  polyethylene glycol (MIRALAX) packet 17 g  Daily PRN        Placed in \"And\" Linked Group    03/29/25 1610    03/29/25 1606  bisacodyl (DULCOLAX) EC tablet 5 mg  Daily PRN        Placed in \"And\" Linked Group    03/29/25 1610    03/29/25 1606  " "bisacodyl (DULCOLAX) suppository 10 mg  Daily PRN        Placed in \"And\" Linked Group    25 1610    25 1606  sennosides-docusate (PERICOLACE) 8.6-50 MG per tablet 2 tablet  2 Times Daily PRN        Placed in \"And\" Linked Group    25 1610    25 1606  acetaminophen (TYLENOL) tablet 650 mg  Every 4 Hours PRN        Placed in \"Or\" Linked Group    25 1610    25 1606  acetaminophen (TYLENOL) 160 MG/5ML oral solution 650 mg  Every 4 Hours PRN        Placed in \"Or\" Linked Group    25 1610    25 1606  acetaminophen (TYLENOL) suppository 650 mg  Every 4 Hours PRN        Placed in \"Or\" Linked Group    25 1610    25 1606  Intake & Output  Every Shift       25 1610    25 1605  nitroglycerin (NITROSTAT) SL tablet 0.4 mg  Every 5 Minutes PRN         25 1610    25 1605  sodium chloride 0.9 % flush 10 mL  As Needed         25 1610    25 1605  sodium chloride 0.9 % infusion 40 mL  As Needed         25 1610    25 1603  albuterol (ACCUNEB) nebulizer solution 0.63 mg  Every 6 Hours PRN         25 1610    --  predniSONE (DELTASONE) 10 MG tablet  Daily         25 1501                  Operative/Procedure Notes (last 24 hours)  Notes from 25 2334 through 25 2334   No notes of this type exist for this encounter.          Physician Progress Notes (last 24 hours)        Eugenie Falcon MD at 25 1139              Name: Pérez Hatfield ADMIT: 3/29/2025   : 1955  PCP: Tuan Champion MD    MRN: [id number] LOS: 1 days   AGE/SEX: 69 y.o. male  ROOM: [facility]     Subjective   Subjective   Patient reports that his shortness of breath is better.  Cough is dry and has improved.  Minimal wheezing.  No fever or chills.  No chest pain.  No hemoptysis.  No palpitation.  No ankle edema.    Review of Systems  GI.  No abdominal pain.  No nausea or vomiting.  .  No dysuria or hematuria  Constitutional.  No fever " "or chills.    Objective   Objective   Vital Signs  Temp:  [98.4 °F (36.9 °C)-99.1 °F (37.3 °C)] 98.4 °F (36.9 °C)  Heart Rate:  [] 90  Resp:  [16-22] 20  BP: ()/(55-77) 120/77  SpO2:  [88 %-96 %] 96 %  on  Flow (L/min) (Oxygen Therapy):  [2-4] 2;   Device (Oxygen Therapy): nasal cannula    Intake/Output Summary (Last 24 hours) at 3/30/2025 1139  Last data filed at 3/29/2025 1355  Gross per 24 hour   Intake 1000 ml   Output --   Net 1000 ml     Body mass index is 27.6 kg/m².      03/29/25  1034   Weight: 97.5 kg (215 lb)     Physical Exam  General.  Middle-aged to elderly gentleman.  Alert and oriented x 4.  In no apparent pain/distress/diaphoresis.  Normal mood and affect.  Eyes.  Pupils equal round and reactive.  Intact extraocular musculature.  No pallor or jaundice  Oral cavity.  Moist mucous membrane.  Neck.  Supple.  No JVD.  No lymphadenopathy no thyromegaly  Cardiovascular.  Regular rate and rhythm with grade 2 systolic murmur  Chest.  Poor bilateral air entry with scattered bilateral expiratory wheeze and scattered bilateral rhonchi  Abdomen.  Soft lax.  No tenderness.  No organomegaly.  No guarding or rebound  Extremities.  No clubbing/cyanosis/edema  CNS.  No acute focal neurological deficits    Results Review:      Results from last 7 days   Lab Units 03/30/25  0555 03/29/25  1033   SODIUM mmol/L 141 137   POTASSIUM mmol/L 4.4 4.2   CHLORIDE mmol/L 104 101   CO2 mmol/L 27.8 27.9   BUN mg/dL 16 12   CREATININE mg/dL 0.88 1.01   GLUCOSE mg/dL 144* 106*   CALCIUM mg/dL 8.4* 8.9     Estimated Creatinine Clearance: 109.3 mL/min (by C-G formula based on SCr of 0.88 mg/dL).          Results from last 7 days   Lab Units 03/29/25  1957 03/29/25  1723   HSTROP T ng/L 8 7             Results from last 7 days   Lab Units 03/29/25  1033   MAGNESIUM mg/dL 1.8           Invalid input(s): \"LDLCALC\"  Results from last 7 days   Lab Units 03/29/25  1033   WBC 10*3/mm3 10.46   HEMOGLOBIN g/dL 15.9   HEMATOCRIT % " 47.7   PLATELETS 10*3/mm3 175   MCV fL 95.2   MCH pg 31.7   MCHC g/dL 33.3   RDW % 12.7   RDW-SD fl 44.2   MPV fL 9.9   NEUTROPHIL % % 81.8*   LYMPHOCYTE % % 7.1*   MONOCYTES % % 9.8   EOSINOPHIL % % 0.4   BASOPHIL % % 0.3   IMM GRAN % % 0.6*   NEUTROS ABS 10*3/mm3 8.57*   LYMPHS ABS 10*3/mm3 0.74   MONOS ABS 10*3/mm3 1.02*   EOS ABS 10*3/mm3 0.04   BASOS ABS 10*3/mm3 0.03   IMMATURE GRANS (ABS) 10*3/mm3 0.06*   NRBC /100 WBC 0.0             Results from last 7 days   Lab Units 03/30/25  0700 03/30/25  0142 03/29/25  1957 03/29/25  1640 03/29/25  1333 03/29/25  1033   PROCALCITONIN ng/mL  --   --   --   --   --  0.04   LACTATE mmol/L 1.8 2.1* 3.6* 2.5* 2.1* 2.2*                 Results from last 7 days   Lab Units 03/29/25  1033   ADENOVIRUS DETECTION BY PCR  Not Detected   CORONAVIRUS 229E  Not Detected   CORONAVIRUS HKU1  Not Detected   CORONAVIRUS NL63  Not Detected   CORONAVIRUS OC43  Not Detected   HUMAN METAPNEUMOVIRUS  Not Detected   HUMAN RHINOVIRUS/ENTEROVIRUS  Not Detected   INFLUENZA B PCR  Not Detected   PARAINFLUENZA 1  Not Detected   PARAINFLUENZA VIRUS 2  Not Detected   PARAINFLUENZA VIRUS 3  Not Detected   PARAINFLUENZA VIRUS 4  Not Detected   BORDETELLA PERTUSSIS PCR  Not Detected   CHLAMYDOPHILA PNEUMONIAE PCR  Not Detected   MYCOPLAMA PNEUMO PCR  Not Detected   RSV, PCR  Not Detected               Imaging:  Imaging Results (Last 24 Hours)       ** No results found for the last 24 hours. **               I reviewed the patient's new clinical results / labs / tests / procedures      Assessment/Plan     Active Hospital Problems    Diagnosis  POA    **Hypoxia [R09.02]  Yes      Resolved Hospital Problems   No resolved problems to display.           Acute hypoxemic respiratory failure secondary to COPD exacerbation (not oxygen or steroid-dependent) and influenza A lower respiratory infection.  Procalcitonin is normal.  Chest x-ray without any infiltrate.  Will continue Symbicort and DuoNebs and  Tamiflu.  Will add p.o. prednisone.  Will add p.o. Zithromax.  Will wean off oxygen.  Will check walking pulse oximetry tomorrow.  Respiratory PCR panel is positive for influenza A.  Lactic acidosis.  Mostly related to hypotension.  There is no evidence of sepsis.  Resolved with IV fluid.  Hypotensive.  Mostly hypovolemia.  Resolved with IV fluid.  Will check TSH.  Cosyntropin test is negative.  Continue IV fluid.  C and L-spine disease and generalized osteoarthritis.  Continue Ultram.  Hyperglycemia.  Check A1c.  History of colon cancer.  No clinical evidence of recurrence.  History of mood disorder.  On no medications.  VTE prophylaxis.  Sequential compression device for      Discussed my findings and plan of treatment with the patient.  Disposition.  Anticipate discharge home tomorrow if continues to improve.        Eugenie Falcon MD  [facility]  25  11:39 EDT           Electronically signed by Eugenie Falcon MD at 25 1146          Consult Notes (last 24 hours)        Argelia Salinas MD at 25 1025          [facility]  Cardiology Consult Note    Patient Identification:  Name: Pérez Hatfield  Age: 69 y.o.  Sex: male  :  1955  MRN: [id number]             Requesting Physician: Dr. Matos    Reason for Consultation / Chief Complaint: management recommendations shortness of breath    History of Present Illness:   69-year-old male admitted with generalized weakness and malaise has been complaining of significant shortness of breath but no chest pain    Comorbid cardiac risk factors:     Past Medical History:  Past Medical History:   Diagnosis Date    Acute hypercapnic respiratory failure 2019    Chest pain 2019    COPD (chronic obstructive pulmonary disease)     Goiter     Herpes 2017    Meniscus tear     Multiple benign polyps of large intestine     Wrist fracture      Past Surgical History:  Past Surgical History:   Procedure  Laterality Date    COLONOSCOPY      HERNIA REPAIR      KNEE ARTHROPLASTY      NECK SURGERY      POLYPECTOMY      TONSILLECTOMY      WRIST SURGERY        Allergies:  No Known Allergies  Home Meds:  Medications Prior to Admission   Medication Sig Dispense Refill Last Dose/Taking    ipratropium-albuterol (DUO-NEB) 0.5-2.5 mg/mL nebulizer 3 (three) times a day.       methylPREDNISolone (Medrol) 4 MG dose pack follow package directions 21 tablet 0     pantoprazole (Protonix) 40 MG EC tablet Take 1 tablet by mouth Daily. 14 tablet 0     predniSONE (DELTASONE) 10 MG tablet Take 1 tablet by mouth Daily.   Unknown    VENTOLIN  (90 BASE) MCG/ACT inhaler inhale 2 puffs every 4 hours if needed  1      Current Meds:   [unfilled]  Social History:   Social History     Tobacco Use    Smoking status: Former     Current packs/day: 0.00     Average packs/day: 1 pack/day for 10.0 years (10.0 ttl pk-yrs)     Types: Cigarettes     Start date: 1976     Quit date: 1986     Years since quittin.2    Smokeless tobacco: Never    Tobacco comments:     pt not sure of when he quit   Substance Use Topics    Alcohol use: Yes     Comment: social      Family History:  Family History   Problem Relation Age of Onset    Alzheimer's disease Other     Thyroid disease Other     Hypertension Other         Review of Systems    Constitutional: No weakness,fatigue, fever, rigors, chills   Eyes: No vision changes, eye pain   ENT/oropharynx: No difficulty swallowing, sore throat, epistaxis, changes in hearing   Cardiovascular: No chest pain, chest tightness, palpitations, paroxysmal nocturnal dyspnea, orthopnea, diaphoresis, dizziness / syncopal episode   Respiratory: Moderate shortness of breath, dyspnea on exertion, cough, wheezing hemoptysis   Gastrointestinal: No abdominal pain, nausea, vomiting, diarrhea, bloody stools   Genitourinary: No hematuria, dysuria   Neurological: No headache, tremors, numbness,  one-sided weakness     Musculoskeletal: No cramps, myalgias,  joint pain, joint swelling   Integument: No rash, edema           Constitutional:  Temp:  [98.4 °F (36.9 °C)-99.1 °F (37.3 °C)] 98.4 °F (36.9 °C)  Heart Rate:  [] 90  Resp:  [16-24] 20  BP: ()/(55-77) 120/77    Physical Exam   General:  Appears in no acute distress  Eyes: PERTL,  HEENT:  No JVD. Thyroid not visibly enlarged. No mucosal pallor or cyanosis  Respiratory: Respirations regular and unlabored at rest. BBS with good air entry in all fields. No crackles, rubs or wheezes auscultated  Cardiovascular: S1S2 Regular rate and rhythm. No murmur, rub or gallop auscultated. No carotid bruits. DP/PT pulses    . No pretibial pitting edema  Gastrointestinal: Abdomen soft, flat, non tender. Bowel sounds present. No hepatosplenomegaly. No ascites  Musculoskeletal: SEE x4. No abnormal movements  Extremities: No digital clubbing or cyanosis  Skin: Color pink. Skin warm and dry to touch. No rashes  No xanthoma  Neuro: AAO x3 CN II-XII grossly intact              Cardiographics  ECG:     Telemetry:    Echocardiogram:     Imaging  Chest X-ray:     Lab Review   Results from last 7 days   Lab Units 03/29/25 1957 03/29/25  1723   HSTROP T ng/L 8 7     Results from last 7 days   Lab Units 03/29/25  1033   MAGNESIUM mg/dL 1.8     Results from last 7 days   Lab Units 03/30/25  0555   SODIUM mmol/L 141   POTASSIUM mmol/L 4.4   BUN mg/dL 16   CREATININE mg/dL 0.88   CALCIUM mg/dL 8.4*     @LABRCNTIPbnp@  Results from last 7 days   Lab Units 03/29/25  1033   WBC 10*3/mm3 10.46   HEMOGLOBIN g/dL 15.9   HEMATOCRIT % 47.7   PLATELETS 10*3/mm3 175       Chest x-ray shows COPD      Assessment:  Shortness of breath  Generalized fever and malaise  Recommendations / Plan:   69-year-old male admitted with generalized fever and malaise complaining of shortness of breath  Blood pressure 1/20/1977  We will repeat EKGs and troponin  Troponin so far has been negative  Check the  echocardiogram    Patient will need a stress test        Labs/tests ordered for am      Argelia Salinas MD  3/30/2025, 10:25 EDT      EMR Dragon/Transcription:   Dictated utilizing Dragon dictation      Electronically signed by Argelia Salinas MD at 03/30/25 0379

## 2025-03-31 NOTE — CASE MANAGEMENT/SOCIAL WORK
Discharge Planning Assessment  [facility]     Patient Name: Pérez Hatfield  MRN: [id number]  Today's Date: 3/31/2025    Admit Date: 3/29/2025    Plan: Home alone   Discharge Needs Assessment       Row Name 03/31/25 1308       Living Environment    People in Home alone    Current Living Arrangements home    Potentially Unsafe Housing Conditions none    Primary Care Provided by self    Family Caregiver if Needed other relative(s)    Family Caregiver Names Niece Laine    Quality of Family Relationships involved;helpful    Able to Return to Prior Arrangements yes       Resource/Environmental Concerns    Resource/Environmental Concerns none    Transportation Concerns none       Transition Planning    Patient/Family Anticipates Transition to home    Patient/Family Anticipated Services at Transition none    Transportation Anticipated family or friend will provide       Discharge Needs Assessment    Readmission Within the Last 30 Days no previous admission in last 30 days    Equipment Currently Used at Home none    Concerns to be Addressed no discharge needs identified    Anticipated Changes Related to Illness none    Equipment Needed After Discharge none                   Discharge Plan       Row Name 03/31/25 1305       Plan    Plan Home alone    Patient/Family in Agreement with Plan yes    Plan Comments Spoke to pt at bedside, introduced self and explained CCP role, verified face sheet and pharmacy information. Pt lives alone in 1 level home with no QIANA, he is IADL's, uses no medical equipment, has no HH or SNF history he plans home with niece to transport, denies dc needs, - CCP will follow - Danuta DWYER                    Expected Discharge Date and Time       Expected Discharge Date Expected Discharge Time    Mar 31, 2025            Demographic Summary       Row Name 03/31/25 1301       General Information    Admission Type inpatient                   Functional Status       Row Name 03/31/25 1309       Functional  Status    Usual Activity Tolerance excellent    Current Activity Tolerance good       Assessment of Health Literacy    Health Literacy Good       Functional Status, IADL    Medications independent    Meal Preparation independent    Housekeeping independent    Laundry independent    Shopping independent       Mental Status    General Appearance WDL WDL       Mental Status Summary    Recent Changes in Mental Status/Cognitive Functioning no changes                   Psychosocial    No documentation.                  Abuse/Neglect    No documentation.                  Legal       Row Name 03/31/25 0722       Financial/Legal    Who Manages Finances if Patient Unable niece                   Substance Abuse    No documentation.                  Patient Forms    No documentation.                     Danuta Marques RN

## 2025-03-31 NOTE — PROGRESS NOTES
[facility]  Cardiology Progress Note    Patient Identification:  Name: Pérez Hatfield  Age: 69 y.o.  Sex: male  :  1955  MRN: [id number]                 Follow Up / Chief Complaint: shortness of breath    Interval History: sitting up in chair, no chest pain. Shortness of breath improving. On room air.        Objective:    Past Medical History:  Past Medical History:   Diagnosis Date    Acute hypercapnic respiratory failure 2019    Chest pain 2019    COPD (chronic obstructive pulmonary disease)     Goiter     Herpes 2017    Meniscus tear     Multiple benign polyps of large intestine     Wrist fracture      Past Surgical History:  Past Surgical History:   Procedure Laterality Date    COLONOSCOPY      HERNIA REPAIR      KNEE ARTHROPLASTY      NECK SURGERY      POLYPECTOMY      TONSILLECTOMY      WRIST SURGERY          Social History:   Social History     Tobacco Use    Smoking status: Former     Current packs/day: 0.00     Average packs/day: 1 pack/day for 10.0 years (10.0 ttl pk-yrs)     Types: Cigarettes     Start date: 1976     Quit date: 1986     Years since quittin.2    Smokeless tobacco: Never    Tobacco comments:     pt not sure of when he quit   Substance Use Topics    Alcohol use: Yes     Comment: social      Family History:  Family History   Problem Relation Age of Onset    Alzheimer's disease Other     Thyroid disease Other     Hypertension Other           Allergies:  No Known Allergies  Scheduled Meds:  azithromycin, 500 mg, Q24H  budesonide-formoterol, 2 puff, BID - RT  ipratropium-albuterol, 3 mL, Q6H While Awake - RT  oseltamivir, 75 mg, Q12H  pantoprazole, 40 mg, Daily  predniSONE, 40 mg, Daily With Breakfast  sodium chloride, 10 mL, Q12H            INTAKE AND OUTPUT:    Intake/Output Summary (Last 24 hours) at 3/31/2025 0953  Last data filed at 3/31/2025 0600  Gross per 24 hour   Intake 2160 ml   Output --   Net 2160 ml       Review of Systems:   GI:  "no n/v or abd pain  Cardiac: no chest pain or palpitations  Pulmonary: no shortness of breath or cough      Constitutional:  Temp:  [97.9 °F (36.6 °C)-98.4 °F (36.9 °C)] 98.4 °F (36.9 °C)  Heart Rate:  [70-97] 70  Resp:  [18-20] 18  BP: (112-147)/(74-86) 135/86    Physical Exam:  General:  Alert, cooperative, appears in no acute distress  Respiratory: Clear to auscultation.  Normal respiratory effort and rate.  Cardiovascular: S1S2 Regular rate and rhythm. No murmur, rub or gallop.   Gastrointestinal: soft, non tender. Bowel sounds present.   Extremities: SEE x4. No pretibial pitting edema. Adequate musculoskeletal strength.   Neuro: AAO x3 CN II-XII grossly intact        Cardiographics  Lab Review   Results from last 7 days   Lab Units 03/29/25  1957 03/29/25  1723   HSTROP T ng/L 8 7     Results from last 7 days   Lab Units 03/29/25  1033   MAGNESIUM mg/dL 1.8     Results from last 7 days   Lab Units 03/31/25  0543   SODIUM mmol/L 140   POTASSIUM mmol/L 4.3   BUN mg/dL 12   CREATININE mg/dL 0.79   CALCIUM mg/dL 8.3*     @LABRCNTIPbnp@  Results from last 7 days   Lab Units 03/31/25  0543 03/29/25  1033   WBC 10*3/mm3 16.34* 10.46   HEMOGLOBIN g/dL 14.4 15.9   HEMATOCRIT % 43.4 47.7   PLATELETS 10*3/mm3 154 175             Assessment:  COPD exacerbation  Influenza A      Plan:  No chest pain  Troponin negative  Stress test and echo to be done as outpatient, discussed with patient and Dr ferguson.     Ok for discharge from cardiac standpoint, the office will call to schedule testing and follow up.       )3/31/2025  MANOLO Aiken Dragon/Transcription:   \"Dictated utilizing Dragon dictation\".     "
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
## 2025-03-31 NOTE — PLAN OF CARE
Goal Outcome Evaluation:               PRN throat spray added during night, one time dose cough syrup given. RA entire night. Patient not sleeping unable to assess need for oxygen during sleep. Chair alarm. Orthostatics done per order.

## 2025-03-31 NOTE — DISCHARGE SUMMARY
Patient Name: Pérez Hatfield  : 1955  MRN: [id number]    Date of Admission: 3/29/2025  Date of Discharge:  3/31/2025  Primary Care Physician: Taun Champion MD      Chief Complaint:   Shortness of Breath and Cough      Discharge Diagnoses     Active Hospital Problems    Diagnosis  POA    **Chronic obstructive pulmonary disease with acute lower respiratory infection [J44.0]  Yes    Hyperglycemia [R73.9]  Yes    Lactic acidosis [E87.20]  Yes    Influenza A (H1N1) [J10.1]  Yes    Hypoxia [R09.02]  Yes    CAFL (chronic airflow limitation) [J44.9]  Yes    Dependent edema [R60.9]  Yes    Cervical spinal stenosis [M48.02]  Yes    Chronic pain [G89.29]  Yes      Resolved Hospital Problems   No resolved problems to display.        Hospital Course     Very pleasant 68yo gentleman with chronic back/neck pain, chronic BLE edema, COPD, and GERD, who presented to ER with worsening SOA for 4 days. Symptoms also included myalgias, fever, headache, fatigue, and dry cough. He was admitted with acute hypoxemic respiratory failure secondary to COPD exacerbation because of influenza A infection. Please see below for details of admission by problem:     Flu A  AECOPD  Hypoxia  Responded well to Tamiflu and Prednisone, oral Zithromax, Symbicort, and PRN DuoNebs  PCT wnl, CXR NAD, RVP o/w negative, afebrile  WBC wnl initially but increased after steroid started  Weaned to RA since yesterday  Home today to complete 5d of Tamiflu and Prednisone  Complete course of Zithromax  F/u with PCP at next available appt    Dyspnea  Hypotension with standing  Suspect his orthostatic symptoms were due to hypovolemia  With IVFs his LA normalized and BPs normalized  Neg orthostatic BPs this AM  ACTH stim test wnl  Admitting MD consulted Card  Card plans Echo and stress test--but these can by done as outpt, pt would prefer that  No CP or palp, no SOA this AM    Hyperglycemia  Suspect due to steroid and acute illness  A1c only 5.3    D/w pt,  RN, and Card NP  Home today  F/u with PCP and Card at next available appt      Day of Discharge     Subjective:  Feeling good today. No SOA. Cough improved. No F/C/NS. No N/V/D/abd pain. Voiding well. Eager to go home.     Physical Exam:  Temp:  [97.9 °F (36.6 °C)-98.4 °F (36.9 °C)] 98.4 °F (36.9 °C)  Heart Rate:  [70-97] 70  Resp:  [18-20] 18  BP: (112-147)/(74-86) 135/86  Body mass index is 27.6 kg/m².  Physical Exam  Vitals and nursing note reviewed. Exam conducted with a chaperone present (RN).   Constitutional:       General: He is not in acute distress.     Appearance: He is ill-appearing (chronically). He is not toxic-appearing or diaphoretic.   HENT:      Head: Normocephalic.      Mouth/Throat:      Mouth: Mucous membranes are moist.      Pharynx: Oropharynx is clear.   Eyes:      General: No scleral icterus.        Right eye: No discharge.         Left eye: No discharge.      Conjunctiva/sclera: Conjunctivae normal.   Cardiovascular:      Rate and Rhythm: Normal rate and regular rhythm.      Pulses: Normal pulses.   Pulmonary:      Effort: Pulmonary effort is normal. No respiratory distress.      Breath sounds: Normal breath sounds. No wheezing or rales.   Abdominal:      General: Bowel sounds are normal. There is no distension.      Palpations: Abdomen is soft.      Tenderness: There is no abdominal tenderness.   Musculoskeletal:         General: Swelling (1+ in BLEs) present.      Cervical back: Neck supple.   Skin:     General: Skin is warm and dry.      Capillary Refill: Capillary refill takes less than 2 seconds.      Coloration: Skin is not jaundiced.   Neurological:      General: No focal deficit present.      Mental Status: He is alert. Mental status is at baseline.   Psychiatric:         Mood and Affect: Mood normal.         Behavior: Behavior normal.         Thought Content: Thought content normal.         Consultants     Consult Orders (all) (From admission, onward)       Start     Ordered     03/29/25 1659  Inpatient Cardiology Consult  Once        Specialty:  Cardiology  Provider:  Argelia Salinas MD    03/29/25 1659    03/29/25 1513  Inpatient Case Management  Consult  Once        Provider:  (Not yet assigned)    03/29/25 1513    03/29/25 1245  [facility] (on-call MD unless specified) Details  Once        Specialty:  Hospitalist  Provider:  (Not yet assigned)    03/29/25 1244                  Procedures     * Surgery not found *    Imaging Results (All)       Procedure Component Value Units Date/Time    XR Chest 1 View [[id number]] Collected: 03/29/25 1106     Updated: 03/29/25 1110    Narrative:      XR CHEST 1 VW-        INDICATION: Dyspnea     COMPARISON: Chest radiograph December 27, 2021     TECHNIQUE: 1 view chest     FINDINGS:      Increased lung volumes. No focal opacity. No effusions. Stable  mediastinum. Heart is normal in size. Anterior cervical discectomy and  fusion.       Impression:      Obstructive airways disease. No lung opacities.     This report was finalized on 3/29/2025 11:07 AM by Dr. Jacob Urrutia M.D on Workstation: [id number]               Results for orders placed in visit on 05/18/15    SCANNED - ECHOCARDIOGRAM    Pertinent Labs     Results from last 7 days   Lab Units 03/31/25  0543 03/29/25  1033   WBC 10*3/mm3 16.34* 10.46   HEMOGLOBIN g/dL 14.4 15.9   PLATELETS 10*3/mm3 154 175     Results from last 7 days   Lab Units 03/31/25  0543 03/30/25  0555 03/29/25  1033   SODIUM mmol/L 140 141 137   POTASSIUM mmol/L 4.3 4.4 4.2   CHLORIDE mmol/L 102 104 101   CO2 mmol/L 29.1* 27.8 27.9   BUN mg/dL 12 16 12   CREATININE mg/dL 0.79 0.88 1.01   GLUCOSE mg/dL 114* 144* 106*   EGFR mL/min/1.73 96.2 93.1 80.5     Results from last 7 days   Lab Units 03/30/25  1238   ALBUMIN g/dL 3.6   BILIRUBIN mg/dL 0.3   ALK PHOS U/L 84   AST (SGOT) U/L 23   ALT (SGPT) U/L 17     Results from last 7 days   Lab Units 03/31/25  0543 03/30/25  1238 03/30/25  0555 03/29/25  1033  "  CALCIUM mg/dL 8.3*  --  8.4* 8.9   ALBUMIN g/dL  --  3.6  --   --    MAGNESIUM mg/dL  --   --   --  1.8       Results from last 7 days   Lab Units 03/29/25 1957 03/29/25  1723   HSTROP T ng/L 8 7           Invalid input(s): \"LDLCALC\"      Results from last 7 days   Lab Units 03/29/25  1033   COVID19  Not Detected       Test Results Pending at Discharge     Pending Results       Procedure [Order ID] Specimen - Date/Time    Adult Transthoracic Echo Complete W/ Cont if Necessary Per Protocol [[id number]]               Discharge Details        Discharge Medications        New Medications        Instructions Start Date   azithromycin 500 MG tablet  Commonly known as: ZITHROMAX   500 mg, Oral, Every 24 Hours Scheduled      budesonide-formoterol 160-4.5 MCG/ACT inhaler  Commonly known as: SYMBICORT   2 puffs, Inhalation, 2 Times Daily - RT      oseltamivir 75 MG capsule  Commonly known as: TAMIFLU   75 mg, Oral, Every 12 Hours Scheduled             Changes to Medications        Instructions Start Date   predniSONE 20 MG tablet  Commonly known as: DELTASONE  What changed:   medication strength  how much to take  when to take this   40 mg, Oral, Daily With Breakfast             Continue These Medications        Instructions Start Date   ipratropium-albuterol 0.5-2.5 mg/3 ml nebulizer  Commonly known as: DUO-NEB   3 Times Daily      pantoprazole 40 MG EC tablet  Commonly known as: Protonix   40 mg, Oral, Daily      Ventolin  (90 Base) MCG/ACT inhaler  Generic drug: albuterol sulfate HFA   inhale 2 puffs every 4 hours if needed             Stop These Medications      methylPREDNISolone 4 MG dose pack  Commonly known as: Medrol              No Known Allergies    Discharge Disposition:  Home or Self Care      Discharge Diet:  Diet Order   Procedures    Diet: Regular/House, Cardiac; Healthy Heart (2-3 Na+); Fluid Consistency: Thin (IDDSI 0)       Discharge Activity:   As tolerated    CODE STATUS:    Code Status and " Medical Interventions: CPR (Attempt to Resuscitate); Full Support   Ordered at: 03/29/25 1610     Code Status (Patient has no pulse and is not breathing):    CPR (Attempt to Resuscitate)     Medical Interventions (Patient has pulse or is breathing):    Full Support       No future appointments.  Additional Instructions for the Follow-ups that You Need to Schedule       Discharge Follow-up with PCP   As directed       Currently Documented PCP:    Tuan Champion MD    PCP Phone Number:    [phone]     Follow Up Details: Dr. Champion (PCP) at next available appt        Discharge Follow-up with Specified Provider: Dr. Salinas (Card) at next available appt   As directed      To: Dr. Salinas ([place]) at next available appt               Follow-up Information       Tuan Champion MD .    Specialty: Family Medicine  Why: Dr. Champion (PCP) at next available appt  Contact information:  [address] [facility] [place] [address]  [phone]                             Additional Instructions for the Follow-ups that You Need to Schedule       Discharge Follow-up with PCP   As directed       Currently Documented PCP:    Tuan Champion MD    PCP Phone Number:    [phone]     Follow Up Details: Dr. Champion (PCP) at next available appt        Discharge Follow-up with Specified Provider: Dr. Salinas ([place]) at next available appt   As directed      To: Dr. Salinas ([place]) at next available appt            Time Spent on Discharge:  Greater than 30 minutes      Torito Benz MD  [facility]  03/31/25  10:34 EDT

## 2025-04-01 ENCOUNTER — READMISSION MANAGEMENT (OUTPATIENT)
Dept: CALL CENTER | Facility: HOSPITAL | Age: 70
End: 2025-04-01
Payer: MEDICARE

## 2025-04-01 NOTE — OUTREACH NOTE
Prep Survey      Flowsheet Row Responses   [facility] facility patient discharged from? [facility]   Is LACE score < 7 ? No   Eligibility Readm Mgmt   Discharge diagnosis *Chronic obstructive pulmonary disease with acute lower respiratory infection (J44.0)   Does the patient have one of the following disease processes/diagnoses(primary or secondary)? COPD   Does the patient have Home health ordered? No   Is there a DME ordered? No   Prep survey completed? Yes            GREG COY - Registered Nurse

## 2025-04-01 NOTE — CASE MANAGEMENT/SOCIAL WORK
Case Management Discharge Note      Final Note: home no needs         Selected Continued Care - Discharged on 3/31/2025 Admission date: 3/29/2025 - Discharge disposition: Home or Self Care      Destination    No services have been selected for the patient.                Durable Medical Equipment    No services have been selected for the patient.                Dialysis/Infusion    No services have been selected for the patient.                Home Medical Care    No services have been selected for the patient.                Therapy    No services have been selected for the patient.                Community Resources    No services have been selected for the patient.                Community & DME    No services have been selected for the patient.                    Transportation Services  Private: Car    Final Discharge Disposition Code: 01 - home or self-care

## 2025-04-11 ENCOUNTER — READMISSION MANAGEMENT (OUTPATIENT)
Dept: CALL CENTER | Facility: HOSPITAL | Age: 70
End: 2025-04-11
Payer: MEDICARE

## 2025-04-11 NOTE — OUTREACH NOTE
COPD/PN Week 1 Survey      Flowsheet Row Responses   [facility] patient discharged from? [place]   Does the patient have one of the following disease processes/diagnoses(primary or secondary)? COPD   Week 1 attempt successful? No   Unsuccessful attempts Attempt 1            Charo Mao Registered Nurse

## 2025-04-15 ENCOUNTER — READMISSION MANAGEMENT (OUTPATIENT)
Dept: CALL CENTER | Facility: HOSPITAL | Age: 70
End: 2025-04-15
Payer: MEDICARE

## 2025-04-15 NOTE — OUTREACH NOTE
COPD/PN Week 1 Survey      Flowsheet Row Responses   [facility] facility patient discharged from? [place]   Does the patient have one of the following disease processes/diagnoses(primary or secondary)? COPD   Week 1 attempt successful? No   Unsuccessful attempts Attempt 2            Alba Mao Registered Nurse

## 2025-04-24 ENCOUNTER — READMISSION MANAGEMENT (OUTPATIENT)
Dept: CALL CENTER | Facility: HOSPITAL | Age: 70
End: 2025-04-24
Payer: MEDICARE

## 2025-04-24 NOTE — OUTREACH NOTE
COPD/PN Week 3 Survey      Flowsheet Row Responses   [facility] patient discharged from? [place]   Does the patient have one of the following disease processes/diagnoses(primary or secondary)? COPD   Week 3 attempt successful? No   Unsuccessful attempts Attempt 1   Revoke Decline to participate            Nancie MARSHALL - Registered Nurse